# Patient Record
Sex: FEMALE | Race: WHITE | NOT HISPANIC OR LATINO | Employment: FULL TIME | URBAN - METROPOLITAN AREA
[De-identification: names, ages, dates, MRNs, and addresses within clinical notes are randomized per-mention and may not be internally consistent; named-entity substitution may affect disease eponyms.]

---

## 2024-10-28 ENCOUNTER — CLINICAL SUPPORT (OUTPATIENT)
Dept: AUDIOLOGY | Facility: CLINIC | Age: 29
End: 2024-10-28
Payer: COMMERCIAL

## 2024-10-28 ENCOUNTER — APPOINTMENT (OUTPATIENT)
Dept: OTOLARYNGOLOGY | Facility: CLINIC | Age: 29
End: 2024-10-28
Payer: COMMERCIAL

## 2024-10-28 VITALS — WEIGHT: 198 LBS | BODY MASS INDEX: 31.82 KG/M2 | HEIGHT: 66 IN

## 2024-10-28 DIAGNOSIS — H61.303 ACQUIRED STENOSIS OF BOTH EXTERNAL EAR CANALS: Primary | ICD-10-CM

## 2024-10-28 DIAGNOSIS — H90.11 CONDUCTIVE HEARING LOSS OF RIGHT EAR WITH UNRESTRICTED HEARING OF LEFT EAR: ICD-10-CM

## 2024-10-28 DIAGNOSIS — H61.22 IMPACTED CERUMEN OF LEFT EAR: ICD-10-CM

## 2024-10-28 DIAGNOSIS — H90.0 CONDUCTIVE HEARING LOSS, BILATERAL: Primary | ICD-10-CM

## 2024-10-28 PROCEDURE — 92557 COMPREHENSIVE HEARING TEST: CPT

## 2024-10-28 PROCEDURE — 3008F BODY MASS INDEX DOCD: CPT | Performed by: OTOLARYNGOLOGY

## 2024-10-28 PROCEDURE — 69210 REMOVE IMPACTED EAR WAX UNI: CPT | Performed by: OTOLARYNGOLOGY

## 2024-10-28 PROCEDURE — 99213 OFFICE O/P EST LOW 20 MIN: CPT | Performed by: OTOLARYNGOLOGY

## 2024-10-28 PROCEDURE — 92567 TYMPANOMETRY: CPT

## 2024-10-28 RX ORDER — PANTOPRAZOLE SODIUM 40 MG/1
40 TABLET, DELAYED RELEASE ORAL
COMMUNITY
Start: 2021-03-09

## 2024-10-28 RX ORDER — MULTIVIT-MIN/FOLIC/VIT K/LYCOP 400-300MCG
TABLET ORAL
COMMUNITY

## 2024-10-28 RX ORDER — METOPROLOL TARTRATE 50 MG/1
1 TABLET ORAL
COMMUNITY
Start: 2024-09-22

## 2024-10-28 RX ORDER — IBUPROFEN 100 MG/5ML
1000 SUSPENSION, ORAL (FINAL DOSE FORM) ORAL
COMMUNITY

## 2024-10-28 RX ORDER — CALCIUM CARBONATE/VITAMIN D3 600 MG-10
TABLET ORAL
COMMUNITY

## 2024-10-28 ASSESSMENT — PATIENT HEALTH QUESTIONNAIRE - PHQ9
2. FEELING DOWN, DEPRESSED OR HOPELESS: NOT AT ALL
1. LITTLE INTEREST OR PLEASURE IN DOING THINGS: NOT AT ALL
SUM OF ALL RESPONSES TO PHQ9 QUESTIONS 1 AND 2: 0

## 2024-12-16 ENCOUNTER — TELEPHONE (OUTPATIENT)
Dept: OTOLARYNGOLOGY | Facility: HOSPITAL | Age: 29
End: 2024-12-16
Payer: COMMERCIAL

## 2024-12-16 NOTE — TELEPHONE ENCOUNTER
Patient called complaining of L ear pain and popping with muffled hearing. States was on steroids for this a few months ago but did not have complete resolution of issue. Wondering if she should go to the emergency room. RN offered an appointment with Dr. Cano at [facility] but patient declined. Will follow up after s/w MD.

## 2024-12-17 ENCOUNTER — OFFICE VISIT (OUTPATIENT)
Dept: OTOLARYNGOLOGY | Facility: CLINIC | Age: 29
End: 2024-12-17
Payer: COMMERCIAL

## 2024-12-17 VITALS — HEIGHT: 67 IN | BODY MASS INDEX: 32.18 KG/M2 | TEMPERATURE: 97.1 F | WEIGHT: 205 LBS

## 2024-12-17 DIAGNOSIS — H61.303 ACQUIRED STENOSIS OF BOTH EXTERNAL EAR CANALS: Primary | ICD-10-CM

## 2024-12-17 DIAGNOSIS — H66.012 ACUTE SUPPURATIVE OTITIS MEDIA OF LEFT EAR WITH SPONTANEOUS RUPTURE OF TYMPANIC MEMBRANE, RECURRENCE NOT SPECIFIED: ICD-10-CM

## 2024-12-17 DIAGNOSIS — H90.11 CONDUCTIVE HEARING LOSS OF RIGHT EAR WITH UNRESTRICTED HEARING OF LEFT EAR: ICD-10-CM

## 2024-12-17 PROBLEM — H65.111 ACUTE ALLERGIC MUCOID OTITIS MEDIA OF RIGHT EAR: Status: ACTIVE | Noted: 2024-12-17

## 2024-12-17 PROCEDURE — 3008F BODY MASS INDEX DOCD: CPT | Performed by: OTOLARYNGOLOGY

## 2024-12-17 PROCEDURE — 1036F TOBACCO NON-USER: CPT | Performed by: OTOLARYNGOLOGY

## 2024-12-17 PROCEDURE — 99214 OFFICE O/P EST MOD 30 MIN: CPT | Performed by: OTOLARYNGOLOGY

## 2024-12-17 RX ORDER — PREDNISONE 10 MG/1
TABLET ORAL
Qty: 42 TABLET | Refills: 0 | Status: SHIPPED | OUTPATIENT
Start: 2024-12-17 | End: 2024-12-29

## 2024-12-17 RX ORDER — AMOXICILLIN AND CLAVULANATE POTASSIUM 875; 125 MG/1; MG/1
875 TABLET, FILM COATED ORAL 2 TIMES DAILY
Qty: 14 TABLET | Refills: 0 | Status: SHIPPED | OUTPATIENT
Start: 2024-12-17 | End: 2024-12-27

## 2024-12-17 NOTE — PROGRESS NOTES
Reason for Consult:  left ear pain (Popping sound and sound sensitivity )     Subjective   History Of Present Illness:  Amanda Banks is a 29 y.o. 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 The skin of the ear canal has healed well, at her last visit there was slight mucosalized drum anteriorly but overall dry.      On the right side, she continued to have conductive hearing loss. At her last visit, we discussed options of repair of the acquired stenosis vs. bone conduction implantation on the right vs. continuing observation. For now we are doing observation.    In the last week she has been having left drainage, muffled hearing and popping.     Past Medical History:  She has no past medical history on file.    Surgical History:  She has no past surgical history on file.     Social History:  She reports that she has never smoked. She has never used smokeless tobacco. No history on file for alcohol use and drug use.    Family History:  family history is not on file.     Medications:  Current Outpatient Medications   Medication Instructions    amoxicillin-pot clavulanate (Augmentin) 875-125 mg tablet 875 mg, oral, 2 times daily    ascorbic acid (VITAMIN C) 1,000 mg, Daily RT    DOCOSAHEXAENOIC ACID ORAL 1 capsule, Daily with breakfast    metoprolol tartrate (Lopressor) 50 mg tablet 2 tablets, Every 12 hours scheduled (0630,1830)    multivitamin with iron (Child Multivitamin Plus Iron) chewable tablet Chew.    pantoprazole (PROTONIX) 40 mg    pediatric multivit 22-D3-vit K 5,000 unit- 1,000 mcg tablet,chewable Chew.    predniSONE (Deltasone) 10 mg tablet Take 6 tablets (60 mg) by mouth once daily for 2 days, THEN 5 tablets  "(50 mg) once daily for 2 days, THEN 4 tablets (40 mg) once daily for 2 days, THEN 3 tablets (30 mg) once daily for 2 days, THEN 2 tablets (20 mg) once daily for 2 days, THEN 1 tablet (10 mg) once daily for 2 days.    tobramycin-dexamethasone (Tobradex ST) drops,suspension OPHTHalmic SUSPension 4 drops, Left Ear, 2 times daily, Apply in affected ear    zinc gluconate 30 mg tablet Take by mouth.      Allergies:  Latex, natural rubber; Sulfa (sulfonamide antibiotics); and Oxycodone-acetaminophen    Review of Systems:   A comprehensive 10-point review of systems was obtained including constitutional, neurological, HEENT, pulmonary, cardiovascular, genito-urinary, and other pertinent systems and was negative except as noted in the HPI.     Objective   Physical Exam:  Last Recorded Vitals: Temperature 36.2 °C (97.1 °F), height 1.689 m (5' 6.5\"), weight 93 kg (205 lb).    On physical exam, the patient is a well-nourished, well-developed patient, in no acute distress, able to communicate without assistance in English language. Head and face is atraumatic and normocephalic. Salivary glands are intact. Facial strength is symmetrical bilaterally.       On ear examination:  Right ear: The patient has an open and patent ear canal,  which ends in a blind pouch.   BC>AC  Left ear: The patient has an open and patent ear canal. The neotympanum looks in good condition, there is some scarring. She has a small whole and has drainage coming from it. This was suctioned.   AC>BC  The Gastelum is right    On vestibular exam, the patient has no spontaneous nystagmus, no headshake nystagmus, no head-thrust nystagmus, and no nystagmus on hyperventilation or Valsalva maneuvers. Cardington-Hallpike maneuver is negative bilaterally.       On neuro exam, the patient is alert and oriented x3, cranial nerves are grossly intact, cerebellar exam is normal.      The rest of the exam, including anterior rhinoscopy, oropharyngeal exam, neck exam, and " cardiovascular exam, were normal including no palpable lymphadenopathies, thyroid in the midline position, normal pulses, and normal chest excursion.       Reviewed Results:  Audiology Testing:   I personally reviewed the audiogram from 10/2024 that showed:   Right ear: mild conductive hearing loss with a 20db of air bone gap. Discrimination: 100%   Left ear: normal hearing . Discrimination: 100%      I reviewed her audiogram from 08/2023 that showed a mild conductive hearing loss on the left side with closure of the air-borne gap within 10dB. On the right side, she has a moderate conductive hearing loss in the low frequencies upsloping to mild levels in the mid and high frequencies. She has a 25dB of air-borne gap in the low and mid frequencies on the right side. She has 100% discrimination bilaterally.       I reviewed her audiogram from 06/2022 that shows mild conductive hearing loss on the right side and a normal hearing on the left side with a 10 decibel air-bone gap. She has 100% discrimination on the right and 96% on the left. This is significant improvement.         I reviewed and interpreted the patient's audiogram 11/2021, which shows mild conductive hearing loss on the right side and moderate conductive hearing loss on the left side. The discrimination is 100% on the right and 96% on the left.         Procedure:  None    Assessment/Plan     1. 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 Acute suppurative otitis media of left ear with spontaneous rupture of tympanic membrane, recurrence not specified    3. Conductive hearing loss of right ear with unrestricted hearing of left ear        In summary, Amanda Banks is a 29 y.o. 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 The skin of the ear canal has healed well, there is a little bit of mucosalized drum anteriorly but it is dry.     On the right side, she continued to have conductive hearing loss. We continue to discussed options of repair of the acquired stenosis vs. bone conduction implantation on the right vs. continuing observation. She elected observation.     The left ear has now an infection with mucoid drainage coming from a small perforation. I put her back on augmentin, tobradex and oral steroids. I will see her back in 4 weeks.    Scribe Attestation  By signing my name below, IJordyn Scribe   attest that this documentation has been prepared under the direction and in the presence of Thierno Marrero MD.       Scribe Attestation  By signing my name below, Armida FRENCH Scribe   attest that this documentation has been prepared under the direction and in the presence of Thierno Marrero MD.     ____________________________________________________  Thierno Cano MD  Professor and Chief   Otology/Neurotology/Lateral Skull-Base Surgery   [facility]

## 2025-01-09 ENCOUNTER — APPOINTMENT (OUTPATIENT)
Dept: OTOLARYNGOLOGY | Facility: CLINIC | Age: 30
End: 2025-01-09
Payer: COMMERCIAL

## 2025-01-09 ENCOUNTER — CLINICAL SUPPORT (OUTPATIENT)
Dept: AUDIOLOGY | Facility: CLINIC | Age: 30
End: 2025-01-09
Payer: COMMERCIAL

## 2025-01-09 VITALS — HEIGHT: 66 IN | BODY MASS INDEX: 33.75 KG/M2 | WEIGHT: 210 LBS | TEMPERATURE: 97.3 F

## 2025-01-09 DIAGNOSIS — Q37.9: ICD-10-CM

## 2025-01-09 DIAGNOSIS — H72.92 TYMPANIC MEMBRANE PERFORATION, LEFT: ICD-10-CM

## 2025-01-09 DIAGNOSIS — H61.303 ACQUIRED STENOSIS OF BOTH EXTERNAL EAR CANALS: Primary | ICD-10-CM

## 2025-01-09 DIAGNOSIS — H69.93 DYSFUNCTION OF BOTH EUSTACHIAN TUBES: ICD-10-CM

## 2025-01-09 DIAGNOSIS — H61.303 ACQUIRED STENOSIS OF BOTH EXTERNAL EAR CANALS: ICD-10-CM

## 2025-01-09 DIAGNOSIS — H90.0 CONDUCTIVE HEARING LOSS, BILATERAL: ICD-10-CM

## 2025-01-09 DIAGNOSIS — H90.0 CONDUCTIVE HEARING LOSS, BILATERAL: Primary | ICD-10-CM

## 2025-01-09 PROBLEM — H90.11 CONDUCTIVE HEARING LOSS OF RIGHT EAR WITH UNRESTRICTED HEARING OF LEFT EAR: Status: RESOLVED | Noted: 2024-10-28 | Resolved: 2025-01-09

## 2025-01-09 PROCEDURE — 3008F BODY MASS INDEX DOCD: CPT | Performed by: OTOLARYNGOLOGY

## 2025-01-09 PROCEDURE — 92567 TYMPANOMETRY: CPT

## 2025-01-09 PROCEDURE — 92557 COMPREHENSIVE HEARING TEST: CPT

## 2025-01-09 PROCEDURE — 99214 OFFICE O/P EST MOD 30 MIN: CPT | Performed by: OTOLARYNGOLOGY

## 2025-01-09 ASSESSMENT — PATIENT HEALTH QUESTIONNAIRE - PHQ9
1. LITTLE INTEREST OR PLEASURE IN DOING THINGS: NOT AT ALL
SUM OF ALL RESPONSES TO PHQ9 QUESTIONS 1 AND 2: 0
2. FEELING DOWN, DEPRESSED OR HOPELESS: NOT AT ALL

## 2025-01-09 NOTE — Clinical Note
I put orders. This  CAN BE DONE anywhere: [facility], [facility], or [place].

## 2025-01-09 NOTE — Clinical Note
January 13, 2025     No Recipients    Patient: Amanda Banks   YOB: 1995   Date of Visit: 1/9/2025       Dear Dr. Siegel Recipients:    Thank you for referring Amanda Banks to me for evaluation. Below are my notes for this consultation.  If you have questions, please do not hesitate to call me. I look forward to following your patient along with you.       Sincerely,     Thierno Marrero MD      CC: No Recipients  ______________________________________________________________________________________            Reason for Consult:  Follow-up (Hearing test )     Subjective  History Of Present Illness:  Amanda Banks is a 29 y.o. 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 The skin of the ear canal has healed well, at her last visit there was slight mucosalized drum anteriorly but overall dry.      On the right side, she continued to have conductive hearing loss. At her last visit, we discussed options of repair of the acquired stenosis vs. bone conduction implantation on the right vs. continuing observation. For now we are doing observation.    At her last visit on 12/17/2024, she had been having left ear drainage, muffled hearing and popping. She had evidence of AOM with mucoid drainage and a new small perforation. She was placed on augmentin, tobradex, and oral steroids. The left ear drainage resolved two weeks ago, but she is still struggling with her hearing on the left side. She is very bothered by her hearing being out of balance, and she is interested in options to help her hearing.     Past Medical History:  She has no past medical history on file.    Surgical History:  She has no past surgical history on  "file.     Social History:  She reports that she has never smoked. She has never used smokeless tobacco. No history on file for alcohol use and drug use.    Family History:  family history is not on file.     Medications:  Current Outpatient Medications   Medication Instructions    ascorbic acid (VITAMIN C) 1,000 mg, Daily RT    DOCOSAHEXAENOIC ACID ORAL 1 capsule, Daily with breakfast    metoprolol tartrate (Lopressor) 50 mg tablet 2 tablets, Every 12 hours scheduled (0630,1830)    multivitamin with iron (Child Multivitamin Plus Iron) chewable tablet Chew.    pantoprazole (PROTONIX) 40 mg    pediatric multivit 22-D3-vit K 5,000 unit- 1,000 mcg tablet,chewable Chew.    zinc gluconate 30 mg tablet Take by mouth.      Allergies:  Latex, natural rubber; Sulfa (sulfonamide antibiotics); and Oxycodone-acetaminophen    Review of Systems:   A comprehensive 10-point review of systems was obtained including constitutional, neurological, HEENT, pulmonary, cardiovascular, genito-urinary, and other pertinent systems and was negative except as noted in the HPI.     Objective  Physical Exam:  Last Recorded Vitals: Temperature 36.3 °C (97.3 °F), height 1.676 m (5' 6\"), weight 95.3 kg (210 lb).    On physical exam, the patient is a well-nourished, well-developed patient, in no acute distress, able to communicate without assistance in English language. Head and face is atraumatic and normocephalic. Salivary glands are intact. Facial strength is symmetrical bilaterally.       On ear examination:  Right ear: The patient has an open and patent ear canal,  which ends in a blind pouch.   BC>AC  Left ear: The patient has an open and patent ear canal. The neotympanum looks in good condition, there is some scarring. She has a small dry hole (< 5%) without drainage.   AC>BC  The Gastelum is right    On neuro exam, the patient is alert and oriented x3, cranial nerves are grossly intact, cerebellar exam is normal.      The rest of the exam, " including anterior rhinoscopy, oropharyngeal exam, neck exam, and cardiovascular exam, were normal including no palpable lymphadenopathies, thyroid in the midline position, normal pulses, and normal chest excursion.       Reviewed Results:  Audiology Testing:  I personally reviewed the audiogram from 1/9/2025 that showed:  Right ear: moderate conductive hearing loss with  25-35dB ABG. Discrimination 92%.   Left ear: mild conductive hearing loss in the low and high frequencies with normal hearing in the mid frequencies, max ABG 15-20dB ABG at 500 and 4000 Hz. Discrimination 96%.      I personally reviewed the audiogram from 10/2024 that showed:   Right ear: mild conductive hearing loss with a 20db of air bone gap. Discrimination: 100%   Left ear: normal hearing . Discrimination: 100%      I reviewed her audiogram from 08/2023 that showed a mild conductive hearing loss on the left side with closure of the air-borne gap within 10dB. On the right side, she has a moderate conductive hearing loss in the low frequencies upsloping to mild levels in the mid and high frequencies. She has a 25dB of air-borne gap in the low and mid frequencies on the right side. She has 100% discrimination bilaterally.       I reviewed her audiogram from 06/2022 that shows mild conductive hearing loss on the right side and a normal hearing on the left side with a 10 decibel air-bone gap. She has 100% discrimination on the right and 96% on the left. This is significant improvement.         I reviewed and interpreted the patient's audiogram 11/2021, which shows mild conductive hearing loss on the right side and moderate conductive hearing loss on the left side. The discrimination is 100% on the right and 96% on the left.         Procedure:  None    Assessment/Plan    1. 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 Conductive hearing loss, bilateral    3. Tympanic membrane perforation, left    4. Dysfunction of both eustachian tubes    5.  Cleft palate and lip ([facility])          In summary, Amanda Banks is a 29 y.o. 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 The skin of the ear canal has healed well, there is a little bit of mucosalized drum anteriorly but it is dry.     On the right side, she continued to have conductive hearing loss. We continued to discuss options of repair of the acquired stenosis vs. bone conduction implantation on the right vs. continuing observation. She elected observation.     The left ear developed an acute infection with spontaneous rupture of the tympanic membrane in mid-December 2024. This has resolved, with a persistent small perforation and associated mild conductive hearing loss. We discussed that given her history of cleft palate, she will have chronic Eustachian tube dysfunction, and this perforation is acting as a physiologic ventilation pathway for her. Given its small size and better hearing status on the left, we do not recommend any intervention on the left.     She is bothered by the hearing loss she has. We discussed options for the right ear including surgical repair of the acquired stenosis vs JESSI. She would like to wait on JESSI as a last resort for either ear. She would like to proceed with correction of the canal stenosis on the right. We will plan for right tympanoplasty with canalplasty and skin graft from the right arm.     The risks, indications, alternatives and complications of doing this procedure were discussed with the patient.  These included, but are not limited to facial nerve injury, deafness in the operated ear, vertigo, dizziness, imbalance, facial weakness or paralysis, change in sense of taste,  perforation of eardrum, pain, bleeding, infection, scarring, need for further surgery, recurrence. She elected to proceed. We will schedule this in the near future.    Jaky Frye MD  Neurotology Fellow    ____________________________________________________  Thierno Cano MD  Professor and Chief   Otology/Neurotology/Lateral Skull-Base Surgery   [facility]

## 2025-01-09 NOTE — PROGRESS NOTES
AUDIOLOGY ADULT EVALUATION      Name:  Amanda Banks   :  1995  Age:  29 y.o.  Date of Evaluation:  2025    Time: 14:55-15:15    IMPRESSIONS     Right ear: Abnormal middle ear functioning with moderate rising to mild conductive hearing loss and excellent (92%) word understanding at 75 dB HL.   Left ear: Abnormal middle ear functioning with mild conductive hearing loss and excellent (96%) word understanding at 75 dB HL.     Amplification needs: Amplification is warranted if conductive hearing loss persists even with medical intervention. She will qualify for bone conduction implantation if she wishes to proceed with amplification options.     RECOMMENDATIONS     Continue medical follow up with primary care provider and/or Ears Nose and Throat (ENT) provider as recommended.  Return for audiologic evaluation in conjunction with medical management or annually (whichever is sooner) to monitor hearing sensitivity and assess middle ear status or sooner should concerns arise. The audiology department can be reached at [phone] to schedule an appointment.   Consider BAHA evaluation.     HISTORY     Ms. Banks, 29 y.o., was seen today for a follow-up audiologic evaluation in conjunction with an evaluation with Thierno Marrero MD, due to known bilateral conductive hearing loss as a result of stenosis and lateralization of both ear drums.     She is s/p left-sided tympanoplasty and canaloplasty with reconstruction of acquired stenosis of the ear canal with skin graft from the left arm.     Today she reported that since her last visit she has had fluid (noted by her PCP) and at least one ear infection. She continues to endorse decreased hearing sensitivity and aural fullness and popping in both ears. She denied tinnitus, dizziness, ear pain or ear drainage.     AUDIOGRAM         TEST RESULTS     Otoscopic Evaluation:  Right Ear: Ear canal clear, tympanic membrane visualized.  Left Ear: Ear canal clear,  tympanic membrane visualized.    Tympanometry (226 Hz): This test is an objective evaluation of middle ear function. CPT code: 47397   Right Ear: Type B, restricted eardrum mobility consistent with outer/middle ear involvement.   Left Ear: Type B, large ear canal volume consistent with possible eardrum perforation.     Acoustic Reflexes: This test is an objective measure of auditory and facial nerve pathways.   (Probe) Right Ear (ipsi right stimulus ear; contralateral left stimulus ear): Could not test due to type B immittance result.  (Probe) Left Ear (ipsi left stimulus ear; contralateral right stimulus ear): Could not test due to type B immittance result.    Distortion Product Otoacoustic Emissions (DPOAE): This test is a measurement of responses which are generated by the cochlea when it is simultaneously stimulated by two pure tone frequencies. CPT code: 42672   Right Ear: Did not test.  Left Ear: Did not test.  Present OAEs suggest normal or near cochlear outer hair cell function for corresponding frequency region(s). Absent OAEs with normal middle ear function can be consistent with some degree of hearing loss. Assessment of cochlear outer hair cell function may be impacted by outer or middle ear function.    Test technique: Conventional Audiometry via headphones. This test is an evaluation hearing sensitivity via air and bone conduction and speech recognition testing. CPT code: 47384  Reliability: good    Pure Tone Audiometry (125-8000 Hz):     Right Ear: Moderate rising to mild conductive hearing loss.   Left Ear: Mild conductive hearing loss rising to normal hearing sensitivity sloping to mild conductive hearing loss.     Speech Audiometry:   Right Ear: Speech Reception Threshold (SRT) was obtained at 35 dB HL. This is in good agreement with three frequency Pure Tone Average.   Word Recognition scores were excellent (92%) in quiet when words were presented at 75 dB HL. These results are based on  [facility] Auditory Test No.6 (NU-6) Ordered by difficulty (N=10).  Left Ear: Speech Reception Threshold (SRT) was obtained at 25 dB HL. This is in good agreement with three frequency Pure Tone Average.   Word Recognition scores were excellent (96%) in quiet when words were presented at 75 dB HL. These results are based on [facility] Auditory Test No.6 (NU-6) Ordered by difficulty (N=10).     Comparison of today's results with previous test results:  Essentially stable in the right ear and slightly progressive in the left ear since last evaluation on 10/28/2024          DEMARCO Stanley, CCC-A  Licensed Clinical Audiologist  Subdivision Lead Audiologist - [facility]     Degree of   Hearing Sensitivity dB Range   Within Normal Limits (WNL) 0 - 20   Slight 25   Mild 26 - 40   Moderate 41 - 55   Moderately-Severe 56 - 70   Severe 71 - 90   Profound 91 +     Key   CHL Conductive Hearing Loss   ECV Ear Canal Volume   HA Hearing Aid   NIHL Noise-Induced Hearing Loss   PTA Pure Tone Average   SNHL Sensorineural Hearing Loss   TM Tympanic Membrane   WNL Within Normal Limits

## 2025-01-09 NOTE — PROGRESS NOTES
Reason for Consult:  Follow-up (Hearing test )     Subjective   History Of Present Illness:  Amanda Banks is a 29 y.o. 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 The skin of the ear canal has healed well, at her last visit there was slight mucosalized drum anteriorly but overall dry.      On the right side, she continued to have conductive hearing loss. At her last visit, we discussed options of repair of the acquired stenosis vs. bone conduction implantation on the right vs. continuing observation. For now we are doing observation.    At her last visit on 12/17/2024, she had been having left ear drainage, muffled hearing and popping. She had evidence of AOM with mucoid drainage and a new small perforation. She was placed on augmentin, tobradex, and oral steroids. The left ear drainage resolved two weeks ago, but she is still struggling with her hearing on the left side. She is very bothered by her hearing being out of balance, and she is interested in options to help her hearing.     Past Medical History:  She has no past medical history on file.    Surgical History:  She has no past surgical history on file.     Social History:  She reports that she has never smoked. She has never used smokeless tobacco. No history on file for alcohol use and drug use.    Family History:  family history is not on file.     Medications:  Current Outpatient Medications   Medication Instructions    ascorbic acid (VITAMIN C) 1,000 mg, Daily RT    DOCOSAHEXAENOIC ACID ORAL 1 capsule, Daily with breakfast    metoprolol tartrate (Lopressor) 50 mg tablet 2 tablets, Every 12 hours scheduled (0630,1830)    multivitamin with iron (Child  "Multivitamin Plus Iron) chewable tablet Chew.    pantoprazole (PROTONIX) 40 mg    pediatric multivit 22-D3-vit K 5,000 unit- 1,000 mcg tablet,chewable Chew.    zinc gluconate 30 mg tablet Take by mouth.      Allergies:  Latex, natural rubber; Sulfa (sulfonamide antibiotics); and Oxycodone-acetaminophen    Review of Systems:   A comprehensive 10-point review of systems was obtained including constitutional, neurological, HEENT, pulmonary, cardiovascular, genito-urinary, and other pertinent systems and was negative except as noted in the HPI.     Objective   Physical Exam:  Last Recorded Vitals: Temperature 36.3 °C (97.3 °F), height 1.676 m (5' 6\"), weight 95.3 kg (210 lb).    On physical exam, the patient is a well-nourished, well-developed patient, in no acute distress, able to communicate without assistance in English language. Head and face is atraumatic and normocephalic. Salivary glands are intact. Facial strength is symmetrical bilaterally.       On ear examination:  Right ear: The patient has an open and patent ear canal,  which ends in a blind pouch.   BC>AC  Left ear: The patient has an open and patent ear canal. The neotympanum looks in good condition, there is some scarring. She has a small dry hole (< 5%) without drainage.   AC>BC  The Gastelum is right    On neuro exam, the patient is alert and oriented x3, cranial nerves are grossly intact, cerebellar exam is normal.      The rest of the exam, including anterior rhinoscopy, oropharyngeal exam, neck exam, and cardiovascular exam, were normal including no palpable lymphadenopathies, thyroid in the midline position, normal pulses, and normal chest excursion.       Reviewed Results:  Audiology Testing:  I personally reviewed the audiogram from 1/9/2025 that showed:  Right ear: moderate conductive hearing loss with  25-35dB ABG. Discrimination 92%.   Left ear: mild conductive hearing loss in the low and high frequencies with normal hearing in the mid " frequencies, max ABG 15-20dB ABG at 500 and 4000 Hz. Discrimination 96%.      I personally reviewed the audiogram from 10/2024 that showed:   Right ear: mild conductive hearing loss with a 20db of air bone gap. Discrimination: 100%   Left ear: normal hearing . Discrimination: 100%      I reviewed her audiogram from 08/2023 that showed a mild conductive hearing loss on the left side with closure of the air-borne gap within 10dB. On the right side, she has a moderate conductive hearing loss in the low frequencies upsloping to mild levels in the mid and high frequencies. She has a 25dB of air-borne gap in the low and mid frequencies on the right side. She has 100% discrimination bilaterally.       I reviewed her audiogram from 06/2022 that shows mild conductive hearing loss on the right side and a normal hearing on the left side with a 10 decibel air-bone gap. She has 100% discrimination on the right and 96% on the left. This is significant improvement.         I reviewed and interpreted the patient's audiogram 11/2021, which shows mild conductive hearing loss on the right side and moderate conductive hearing loss on the left side. The discrimination is 100% on the right and 96% on the left.         Procedure:  None    Assessment/Plan     1. 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 Conductive hearing loss, bilateral    3. Tympanic membrane perforation, left    4. Dysfunction of both eustachian tubes    5. Cleft palate and lip ([facility])          In summary, Amanda Banks is a 29 y.o. 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 The skin of the ear canal has healed well, there is a little bit of mucosalized drum anteriorly but  it is dry.     On the right side, she continued to have conductive hearing loss. We continued to discuss options of repair of the acquired stenosis vs. bone conduction implantation on the right vs. continuing observation. She elected observation.     The left ear developed an acute infection with spontaneous rupture of the tympanic membrane in mid-December 2024. This has resolved, with a persistent small perforation and associated mild conductive hearing loss. We discussed that given her history of cleft palate, she will have chronic Eustachian tube dysfunction, and this perforation is acting as a physiologic ventilation pathway for her. Given its small size and better hearing status on the left, we do not recommend any intervention on the left.     She is bothered by the hearing loss she has. We discussed options for the right ear including surgical repair of the acquired stenosis vs JESSI. She would like to wait on JESSI as a last resort for either ear. She would like to proceed with correction of the canal stenosis on the right. We will plan for right tympanoplasty with canalplasty and skin graft from the right arm.     The risks, indications, alternatives and complications of doing this procedure were discussed with the patient.  These included, but are not limited to facial nerve injury, deafness in the operated ear, vertigo, dizziness, imbalance, facial weakness or paralysis, change in sense of taste, perforation of eardrum, pain, bleeding, infection, scarring, need for further surgery, recurrence. She elected to proceed. We will schedule this in the near future.    Jaky Frye MD  Neurotology Fellow    ____________________________________________________  Thierno Cano MD  Professor and Chief   Otology/Neurotology/Lateral Skull-Base Surgery   [facility]

## 2025-01-14 DIAGNOSIS — H65.111 ACUTE ALLERGIC MUCOID OTITIS MEDIA OF RIGHT EAR: ICD-10-CM

## 2025-01-14 DIAGNOSIS — H90.0 CONDUCTIVE HEARING LOSS, BILATERAL: ICD-10-CM

## 2025-01-14 DIAGNOSIS — Z01.818 PREOPERATIVE CLEARANCE: ICD-10-CM

## 2025-01-30 ENCOUNTER — TELEPHONE (OUTPATIENT)
Dept: OTOLARYNGOLOGY | Facility: HOSPITAL | Age: 30
End: 2025-01-30
Payer: COMMERCIAL

## 2025-01-30 DIAGNOSIS — H72.92 TYMPANIC MEMBRANE PERFORATION, LEFT: ICD-10-CM

## 2025-01-30 RX ORDER — AMOXICILLIN AND CLAVULANATE POTASSIUM 875; 125 MG/1; MG/1
1 TABLET, FILM COATED ORAL 2 TIMES DAILY
Qty: 20 TABLET | Refills: 0 | Status: SHIPPED | OUTPATIENT
Start: 2025-01-30 | End: 2025-02-09

## 2025-01-30 NOTE — TELEPHONE ENCOUNTER
Patient reports continued L ear pain despite finishing abx and steroid taper. States drainage and popping of L ear have resolved. Is using tobradex drops without any relief of pain. MD to advise.

## 2025-02-10 ENCOUNTER — APPOINTMENT (OUTPATIENT)
Dept: OTOLARYNGOLOGY | Facility: CLINIC | Age: 30
End: 2025-02-10
Payer: COMMERCIAL

## 2025-02-10 VITALS — HEIGHT: 66 IN | WEIGHT: 210 LBS | BODY MASS INDEX: 33.75 KG/M2

## 2025-02-10 DIAGNOSIS — H72.92 TYMPANIC MEMBRANE PERFORATION, LEFT: ICD-10-CM

## 2025-02-10 DIAGNOSIS — Q37.9: ICD-10-CM

## 2025-02-10 DIAGNOSIS — H69.93 DYSFUNCTION OF BOTH EUSTACHIAN TUBES: ICD-10-CM

## 2025-02-10 DIAGNOSIS — H61.303 ACQUIRED STENOSIS OF BOTH EXTERNAL EAR CANALS: Primary | ICD-10-CM

## 2025-02-10 DIAGNOSIS — H90.0 CONDUCTIVE HEARING LOSS, BILATERAL: ICD-10-CM

## 2025-02-10 PROCEDURE — 3008F BODY MASS INDEX DOCD: CPT | Performed by: OTOLARYNGOLOGY

## 2025-02-10 PROCEDURE — 99214 OFFICE O/P EST MOD 30 MIN: CPT | Performed by: OTOLARYNGOLOGY

## 2025-02-10 RX ORDER — FLUTICASONE PROPIONATE 50 MCG
2 SPRAY, SUSPENSION (ML) NASAL DAILY
Qty: 16 G | Refills: 11 | Status: SHIPPED | OUTPATIENT
Start: 2025-02-10 | End: 2026-02-10

## 2025-02-10 RX ORDER — NARATRIPTAN 2.5 MG/1
2.5 TABLET ORAL ONCE AS NEEDED
COMMUNITY

## 2025-02-10 RX ORDER — AMITRIPTYLINE HYDROCHLORIDE 10 MG/1
10 TABLET, FILM COATED ORAL
COMMUNITY

## 2025-02-10 NOTE — LETTER
February 14, 2025     Miguel Angel Mason, CARRIE-CNP  [address]  [facility] [address]    Patient: Amanda Banks   YOB: 1995   Date of Visit: 2/10/2025       Dear CARRIE Olivo-CNP:    Thank you for referring Amanda Banks to me for evaluation. Below are my notes for this consultation.  If you have questions, please do not hesitate to call me. I look forward to following your patient along with you.       Sincerely,     Thierno Marrero MD      CC: No Recipients  ______________________________________________________________________________________            Reason for Consult:  Follow-up (Left ear pain)     Subjective  History Of Present Illness:  Amanda Banks is a 29 y.o. 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 The skin of the ear canal has healed well, at her last visit there was slight mucosalized drum anteriorly but overall dry.      On the right side, she continued to have conductive hearing loss. At her last visit, we discussed options of repair of the acquired stenosis vs. bone conduction implantation on the right vs. continuing observation. For now we are doing observation.     At her last visit on 12/17/2024, she had been having left ear drainage, muffled hearing and popping. She had evidence of AOM with mucoid drainage and a new small perforation. She was placed on augmentin, tobradex, and oral steroids. The left ear drainage resolved two weeks ago, but she is still struggling with her hearing on the left side. She is very bothered by her hearing being out of balance, and she is interested in options to help her hearing.     She now has a small  pinpoint perforation and right stenosis of the EAC. She reports a popping sensation when not chewing, especially in the mornings. This is consistent with eustachian tube dysfunction.     Past Medical History:  She has a past medical history of Adverse effect of anesthesia, Arrhythmia, blood clots, and Migraines.    Surgical History:  She has a past surgical history that includes Other surgical history; Tympanostomy tube placement; Lumbar fusion; Cosmetic surgery; Appendectomy; and Other surgical history.     Social History:  She reports that she has never smoked. She has never used smokeless tobacco. She reports that she does not currently use alcohol. She reports that she does not use drugs.    Family History:  family history is not on file.     Medications:  Current Outpatient Medications   Medication Instructions   • amitriptyline (ELAVIL) 10 mg, oral   • ascorbic acid (VITAMIN C) 1,000 mg, Daily RT   • DOCOSAHEXAENOIC ACID ORAL 1 capsule, Daily with breakfast   • fluticasone (Flonase) 50 mcg/actuation nasal spray 2 sprays, Each Nostril, Daily, Shake gently. Before first use, prime pump. After use, clean tip and replace cap.   • GARLIC ORAL oral   • metoprolol tartrate (Lopressor) 50 mg tablet 2 tablets, Every 12 hours scheduled (0630,1830)   • multivitamin with iron (Child Multivitamin Plus Iron) chewable tablet Chew.   • naratriptan (AMERGE) 2.5 mg, oral, Once as needed   • pantoprazole (PROTONIX) 40 mg   • pediatric multivit 22-D3-vit K 5,000 unit- 1,000 mcg tablet,chewable Chew.   • zinc gluconate 30 mg tablet Take by mouth.      Allergies:  Latex, natural rubber; Sulfa (sulfonamide antibiotics); and Oxycodone-acetaminophen    Review of Systems:   A comprehensive 10-point review of systems was obtained including constitutional, neurological, HEENT, pulmonary, cardiovascular, genito-urinary, and other pertinent systems and was negative except as noted in the HPI.     Objective  Physical Exam:  Last Recorded  "Vitals: Height 1.676 m (5' 6\"), weight 95.3 kg (210 lb).    On physical exam, the patient is a well-nourished, well-developed patient, in no acute distress, able to communicate without assistance in English language. Head and face is atraumatic and normocephalic. Salivary glands are intact. Facial strength is symmetrical bilaterally.       On ear examination:  Right ear: The patient has an open and patent ear canal. Acquired stenosis of ear canal ending in a blind pouch.    BC>AC  Left ear: The patient has an open and patent ear canal. The neotympanum  is thickened with a pinpoint perforation in the anterior inferior quadrant .  AC>BC  The Gastelum is right    On vestibular exam, the patient has no spontaneous nystagmus, no headshake nystagmus, no head-thrust nystagmus, and no nystagmus on hyperventilation or Valsalva maneuvers. [place]-Hallpike maneuver is negative bilaterally.       On neuro exam, the patient is alert and oriented x3, cranial nerves are grossly intact, cerebellar exam is normal.      The rest of the exam, including anterior rhinoscopy, oropharyngeal exam, neck exam, and cardiovascular exam, were normal including no palpable lymphadenopathies, thyroid in the midline position, normal pulses, and normal chest excursion.       Reviewed Results:  Audiology Testing:  I personally reviewed the audiogram from 1/9/2025 that showed:  Right ear: moderate conductive hearing loss with  25-35dB ABG. Discrimination 92%.   Left ear: mild conductive hearing loss in the low and high frequencies with normal hearing in the mid frequencies, max ABG 15-20dB ABG at 500 and 4000 Hz. Discrimination 96%.       I personally reviewed the audiogram from 10/2024 that showed:            Right ear: mild conductive hearing loss with a 20db of air bone gap. Discrimination: 100%            Left ear: normal hearing . Discrimination: 100%       I reviewed her audiogram from 08/2023 that showed a mild conductive hearing loss on the left side " with closure of the air-borne gap within 10dB. On the right side, she has a moderate conductive hearing loss in the low frequencies upsloping to mild levels in the mid and high frequencies. She has a 25dB of air-borne gap in the low and mid frequencies on the right side. She has 100% discrimination bilaterally.       I reviewed her audiogram from 06/2022 that shows mild conductive hearing loss on the right side and a normal hearing on the left side with a 10 decibel air-bone gap. She has 100% discrimination on the right and 96% on the left. This is significant improvement.          I reviewed and interpreted the patient's audiogram 11/2021, which shows mild conductive hearing loss on the right side and moderate conductive hearing loss on the left side. The discrimination is 100% on the right and 96% on the left.      Imaging:  None     Procedure:  None    Assessment/Plan    1. 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 Conductive hearing loss, bilateral    3. Tympanic membrane perforation, left    4. Dysfunction of both eustachian tubes    5. Cleft palate and lip ([facility])        In summary, Amanda Banks is a 29 y.o. 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 The skin of the ear canal has healed well, there is a little bit of mucosalized drum anteriorly but it is dry.     On the right side, she continued to have conductive hearing loss. We continued to discuss options of repair of the acquired stenosis vs. bone conduction implantation on the right vs. continuing observation. She elected observation.    On the left side she has thickening of neotympanum with a pinpoint perforation. Her left ear was her better hearing ear.  The left ear developed an acute infection with spontaneous rupture of the tympanic membrane in mid-December 2024. This has resolved, with a persistent small perforation and associated mild conductive hearing loss. We discussed that given her history of cleft palate, she will have chronic Eustachian tube dysfunction, and this perforation is acting as a physiologic ventilation pathway for her. Given its small size and better hearing status on the left, we do not recommend any intervention on the left.     She is bothered by the hearing loss she has. We discussed options for the right ear including surgical repair of the acquired stenosis vs JESSI. She would like to wait on JESSI as a last resort for either ear. She would like to proceed with correction of the canal stenosis on the right. We will plan for right tympanoplasty with canalplasty and skin graft from the right arm.     The right ear has gotten worse. She is interested in repairing of her acquired stenosis on the right. She is scheduled for surgery on 02/25. She has a CT scheduled prior to surgery. I will see her back before surgery. She was provided with a Flonase prescription today.     The risks, indications, alternatives and complications of doing this procedure were discussed with the patient.  She elected to proceed.          ____________________________________________________  Thierno Cano MD  Professor and Chief   Otology/Neurotology/Lateral Skull-Base Surgery   [facility]    Scribe Attestation  By signing my name below, I, Tracy Abreu, Scribe   attest that this documentation has been prepared under the direction and in the presence of Thierno Marrero MD.

## 2025-02-10 NOTE — PROGRESS NOTES
Reason for Consult:  Follow-up (Left ear pain)     Subjective   History Of Present Illness:  Amanda Banks is a 29 y.o. 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 The skin of the ear canal has healed well, at her last visit there was slight mucosalized drum anteriorly but overall dry.      On the right side, she continued to have conductive hearing loss. At her last visit, we discussed options of repair of the acquired stenosis vs. bone conduction implantation on the right vs. continuing observation. For now we are doing observation.     At her last visit on 12/17/2024, she had been having left ear drainage, muffled hearing and popping. She had evidence of AOM with mucoid drainage and a new small perforation. She was placed on augmentin, tobradex, and oral steroids. The left ear drainage resolved two weeks ago, but she is still struggling with her hearing on the left side. She is very bothered by her hearing being out of balance, and she is interested in options to help her hearing.     She now has a small pinpoint perforation and right stenosis of the EAC. She reports a popping sensation when not chewing, especially in the mornings. This is consistent with eustachian tube dysfunction.     Past Medical History:  She has a past medical history of Adverse effect of anesthesia, Arrhythmia, blood clots, and Migraines.    Surgical History:  She has a past surgical history that includes Other surgical history; Tympanostomy tube placement; Lumbar fusion; Cosmetic surgery; Appendectomy; and Other surgical history.     Social History:  She reports that she has never smoked. She has never used smokeless tobacco. She reports that she  "does not currently use alcohol. She reports that she does not use drugs.    Family History:  family history is not on file.     Medications:  Current Outpatient Medications   Medication Instructions    amitriptyline (ELAVIL) 10 mg, oral    ascorbic acid (VITAMIN C) 1,000 mg, Daily RT    DOCOSAHEXAENOIC ACID ORAL 1 capsule, Daily with breakfast    fluticasone (Flonase) 50 mcg/actuation nasal spray 2 sprays, Each Nostril, Daily, Shake gently. Before first use, prime pump. After use, clean tip and replace cap.    GARLIC ORAL oral    metoprolol tartrate (Lopressor) 50 mg tablet 2 tablets, Every 12 hours scheduled (0630,1830)    multivitamin with iron (Child Multivitamin Plus Iron) chewable tablet Chew.    naratriptan (AMERGE) 2.5 mg, oral, Once as needed    pantoprazole (PROTONIX) 40 mg    pediatric multivit 22-D3-vit K 5,000 unit- 1,000 mcg tablet,chewable Chew.    zinc gluconate 30 mg tablet Take by mouth.      Allergies:  Latex, natural rubber; Sulfa (sulfonamide antibiotics); and Oxycodone-acetaminophen    Review of Systems:   A comprehensive 10-point review of systems was obtained including constitutional, neurological, HEENT, pulmonary, cardiovascular, genito-urinary, and other pertinent systems and was negative except as noted in the HPI.     Objective   Physical Exam:  Last Recorded Vitals: Height 1.676 m (5' 6\"), weight 95.3 kg (210 lb).    On physical exam, the patient is a well-nourished, well-developed patient, in no acute distress, able to communicate without assistance in English language. Head and face is atraumatic and normocephalic. Salivary glands are intact. Facial strength is symmetrical bilaterally.       On ear examination:  Right ear: The patient has an open and patent ear canal. Acquired stenosis of ear canal ending in a blind pouch.    BC>AC  Left ear: The patient has an open and patent ear canal. The neotympanum  is thickened with a pinpoint perforation in the anterior inferior quadrant " .  AC>BC  The Gastelum is right    On vestibular exam, the patient has no spontaneous nystagmus, no headshake nystagmus, no head-thrust nystagmus, and no nystagmus on hyperventilation or Valsalva maneuvers. [place]-Hallpike maneuver is negative bilaterally.       On neuro exam, the patient is alert and oriented x3, cranial nerves are grossly intact, cerebellar exam is normal.      The rest of the exam, including anterior rhinoscopy, oropharyngeal exam, neck exam, and cardiovascular exam, were normal including no palpable lymphadenopathies, thyroid in the midline position, normal pulses, and normal chest excursion.       Reviewed Results:  Audiology Testing:  I personally reviewed the audiogram from 1/9/2025 that showed:  Right ear: moderate conductive hearing loss with  25-35dB ABG. Discrimination 92%.   Left ear: mild conductive hearing loss in the low and high frequencies with normal hearing in the mid frequencies, max ABG 15-20dB ABG at 500 and 4000 Hz. Discrimination 96%.       I personally reviewed the audiogram from 10/2024 that showed:            Right ear: mild conductive hearing loss with a 20db of air bone gap. Discrimination: 100%            Left ear: normal hearing . Discrimination: 100%       I reviewed her audiogram from 08/2023 that showed a mild conductive hearing loss on the left side with closure of the air-borne gap within 10dB. On the right side, she has a moderate conductive hearing loss in the low frequencies upsloping to mild levels in the mid and high frequencies. She has a 25dB of air-borne gap in the low and mid frequencies on the right side. She has 100% discrimination bilaterally.       I reviewed her audiogram from 06/2022 that shows mild conductive hearing loss on the right side and a normal hearing on the left side with a 10 decibel air-bone gap. She has 100% discrimination on the right and 96% on the left. This is significant improvement.          I reviewed and interpreted the patient's  audiogram 11/2021, which shows mild conductive hearing loss on the right side and moderate conductive hearing loss on the left side. The discrimination is 100% on the right and 96% on the left.      Imaging:  None     Procedure:  None    Assessment/Plan     1. 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 Conductive hearing loss, bilateral    3. Tympanic membrane perforation, left    4. Dysfunction of both eustachian tubes    5. Cleft palate and lip ([facility])        In summary, Amanda Banks is a 29 y.o. 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 The skin of the ear canal has healed well, there is a little bit of mucosalized drum anteriorly but it is dry.     On the right side, she continued to have conductive hearing loss. We continued to discuss options of repair of the acquired stenosis vs. bone conduction implantation on the right vs. continuing observation. She elected observation.    On the left side she has thickening of neotympanum with a pinpoint perforation. Her left ear was her better hearing ear. The left ear developed an acute infection with spontaneous rupture of the tympanic membrane in mid-December 2024. This has resolved, with a persistent small perforation and associated mild conductive hearing loss. We discussed that given her history of cleft palate, she will have chronic Eustachian tube dysfunction, and this perforation is acting as a physiologic ventilation pathway for her. Given its small size and better hearing status on the left, we do not recommend any intervention on the left.     She is bothered by the hearing loss she has. We discussed options for the right ear including surgical repair of the  acquired stenosis vs JESSI. She would like to wait on JESSI as a last resort for either ear. She would like to proceed with correction of the canal stenosis on the right. We will plan for right tympanoplasty with canalplasty and skin graft from the right arm.     The right ear has gotten worse. She is interested in repairing of her acquired stenosis on the right. She is scheduled for surgery on 02/25. She has a CT scheduled prior to surgery. I will see her back before surgery. She was provided with a Flonase prescription today.     The risks, indications, alternatives and complications of doing this procedure were discussed with the patient.  She elected to proceed.          ____________________________________________________  Thierno Cano MD  Professor and Chief   Otology/Neurotology/Lateral Skull-Base Surgery   [facility]    Scribe Attestation  By signing my name below, I, Tracy Abreu, Scribe   attest that this documentation has been prepared under the direction and in the presence of Thierno Marrero MD.

## 2025-02-10 NOTE — H&P (VIEW-ONLY)
Reason for Consult:  Follow-up (Left ear pain)     Subjective   History Of Present Illness:  Amanda Banks is a 29 y.o. 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 The skin of the ear canal has healed well, at her last visit there was slight mucosalized drum anteriorly but overall dry.      On the right side, she continued to have conductive hearing loss. At her last visit, we discussed options of repair of the acquired stenosis vs. bone conduction implantation on the right vs. continuing observation. For now we are doing observation.     At her last visit on 12/17/2024, she had been having left ear drainage, muffled hearing and popping. She had evidence of AOM with mucoid drainage and a new small perforation. She was placed on augmentin, tobradex, and oral steroids. The left ear drainage resolved two weeks ago, but she is still struggling with her hearing on the left side. She is very bothered by her hearing being out of balance, and she is interested in options to help her hearing.     She now has a small pinpoint perforation and right stenosis of the EAC. She reports a popping sensation when not chewing, especially in the mornings. This is consistent with eustachian tube dysfunction.     Past Medical History:  She has a past medical history of Adverse effect of anesthesia, Arrhythmia, blood clots, and Migraines.    Surgical History:  She has a past surgical history that includes Other surgical history; Tympanostomy tube placement; Lumbar fusion; Cosmetic surgery; Appendectomy; and Other surgical history.     Social History:  She reports that she has never smoked. She has never used smokeless tobacco. She reports that she  "does not currently use alcohol. She reports that she does not use drugs.    Family History:  family history is not on file.     Medications:  Current Outpatient Medications   Medication Instructions    amitriptyline (ELAVIL) 10 mg, oral    ascorbic acid (VITAMIN C) 1,000 mg, Daily RT    DOCOSAHEXAENOIC ACID ORAL 1 capsule, Daily with breakfast    fluticasone (Flonase) 50 mcg/actuation nasal spray 2 sprays, Each Nostril, Daily, Shake gently. Before first use, prime pump. After use, clean tip and replace cap.    GARLIC ORAL oral    metoprolol tartrate (Lopressor) 50 mg tablet 2 tablets, Every 12 hours scheduled (0630,1830)    multivitamin with iron (Child Multivitamin Plus Iron) chewable tablet Chew.    naratriptan (AMERGE) 2.5 mg, oral, Once as needed    pantoprazole (PROTONIX) 40 mg    pediatric multivit 22-D3-vit K 5,000 unit- 1,000 mcg tablet,chewable Chew.    zinc gluconate 30 mg tablet Take by mouth.      Allergies:  Latex, natural rubber; Sulfa (sulfonamide antibiotics); and Oxycodone-acetaminophen    Review of Systems:   A comprehensive 10-point review of systems was obtained including constitutional, neurological, HEENT, pulmonary, cardiovascular, genito-urinary, and other pertinent systems and was negative except as noted in the HPI.     Objective   Physical Exam:  Last Recorded Vitals: Height 1.676 m (5' 6\"), weight 95.3 kg (210 lb).    On physical exam, the patient is a well-nourished, well-developed patient, in no acute distress, able to communicate without assistance in English language. Head and face is atraumatic and normocephalic. Salivary glands are intact. Facial strength is symmetrical bilaterally.       On ear examination:  Right ear: The patient has an open and patent ear canal. Acquired stenosis of ear canal ending in a blind pouch.    BC>AC  Left ear: The patient has an open and patent ear canal. The neotympanum  is thickened with a pinpoint perforation in the anterior inferior quadrant " .  AC>BC  The Gastelum is right    On vestibular exam, the patient has no spontaneous nystagmus, no headshake nystagmus, no head-thrust nystagmus, and no nystagmus on hyperventilation or Valsalva maneuvers. [place]-Hallpike maneuver is negative bilaterally.       On neuro exam, the patient is alert and oriented x3, cranial nerves are grossly intact, cerebellar exam is normal.      The rest of the exam, including anterior rhinoscopy, oropharyngeal exam, neck exam, and cardiovascular exam, were normal including no palpable lymphadenopathies, thyroid in the midline position, normal pulses, and normal chest excursion.       Reviewed Results:  Audiology Testing:  I personally reviewed the audiogram from 1/9/2025 that showed:  Right ear: moderate conductive hearing loss with  25-35dB ABG. Discrimination 92%.   Left ear: mild conductive hearing loss in the low and high frequencies with normal hearing in the mid frequencies, max ABG 15-20dB ABG at 500 and 4000 Hz. Discrimination 96%.       I personally reviewed the audiogram from 10/2024 that showed:            Right ear: mild conductive hearing loss with a 20db of air bone gap. Discrimination: 100%            Left ear: normal hearing . Discrimination: 100%       I reviewed her audiogram from 08/2023 that showed a mild conductive hearing loss on the left side with closure of the air-borne gap within 10dB. On the right side, she has a moderate conductive hearing loss in the low frequencies upsloping to mild levels in the mid and high frequencies. She has a 25dB of air-borne gap in the low and mid frequencies on the right side. She has 100% discrimination bilaterally.       I reviewed her audiogram from 06/2022 that shows mild conductive hearing loss on the right side and a normal hearing on the left side with a 10 decibel air-bone gap. She has 100% discrimination on the right and 96% on the left. This is significant improvement.          I reviewed and interpreted the patient's  audiogram 11/2021, which shows mild conductive hearing loss on the right side and moderate conductive hearing loss on the left side. The discrimination is 100% on the right and 96% on the left.      Imaging:  None     Procedure:  None    Assessment/Plan     1. 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 Conductive hearing loss, bilateral    3. Tympanic membrane perforation, left    4. Dysfunction of both eustachian tubes    5. Cleft palate and lip ([facility])        In summary, Amanda Banks is a 29 y.o. 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 The skin of the ear canal has healed well, there is a little bit of mucosalized drum anteriorly but it is dry.     On the right side, she continued to have conductive hearing loss. We continued to discuss options of repair of the acquired stenosis vs. bone conduction implantation on the right vs. continuing observation. She elected observation.    On the left side she has thickening of neotympanum with a pinpoint perforation. Her left ear was her better hearing ear. The left ear developed an acute infection with spontaneous rupture of the tympanic membrane in mid-December 2024. This has resolved, with a persistent small perforation and associated mild conductive hearing loss. We discussed that given her history of cleft palate, she will have chronic Eustachian tube dysfunction, and this perforation is acting as a physiologic ventilation pathway for her. Given its small size and better hearing status on the left, we do not recommend any intervention on the left.     She is bothered by the hearing loss she has. We discussed options for the right ear including surgical repair of the  acquired stenosis vs JESSI. She would like to wait on JESSI as a last resort for either ear. She would like to proceed with correction of the canal stenosis on the right. We will plan for right tympanoplasty with canalplasty and skin graft from the right arm.     The right ear has gotten worse. She is interested in repairing of her acquired stenosis on the right. She is scheduled for surgery on 02/25. She has a CT scheduled prior to surgery. I will see her back before surgery. She was provided with a Flonase prescription today.     The risks, indications, alternatives and complications of doing this procedure were discussed with the patient.  She elected to proceed.          ____________________________________________________  Thierno Cano MD  Professor and Chief   Otology/Neurotology/Lateral Skull-Base Surgery   [facility]    Scribe Attestation  By signing my name below, I, Tracy Abreu, Scribe   attest that this documentation has been prepared under the direction and in the presence of Thierno Marrero MD.

## 2025-02-10 NOTE — PREPROCEDURE INSTRUCTIONS
Pre-Operative Instructions &?Checklist      Your surgery has been scheduled at [facility] at [address] [place], [address], [address], in the [facility]. Parking is to the left of the main entrance.     You will be contacted about the time of your surgery the day before your surgery (if your surgery is on a Monday, you will be called the Friday before surgery). If you are unable to answer the phone, a detailed voicemail message will be left. Make sure that your voicemail box is not full so a message can be left. If you have not received a call by 3:00 pm you may call [phone] between the hours of 3:00 and 4:00 pm. Please be available by phone the night before/day of surgery in case there is a change in the schedule which may require you to arrive earlier/later.     ?     14 DAYS BEFORE SURGERY STOP TAKING WEIGHT LOSS MEDICATIONS      ?7 DAYS BEFORE SURGERY STOP THESE MEDICATIONS:       *Multiple Vitamins containing Vitamin E       * Herbal supplements, Fish Oil, garlic pills, turmeric, CoQ enzyme       *Stop taking aspirin, aspirin-containing products, and NSAID's like Advil, Motrin, Aleve, Ibuprofen, Meloxicam, Celecoxib, and Diclofenac.. Tylenol is okay to take for pain relief.        *If you are currently taking Coumadin/Warfarin, we will have to coordinate that with your PCP &/or the Anticoagulation Clinic.     THE DAY BEFORE SURGERY:       *Do not eat any food after midnight the night before surgery.        *You are permitted to have no more than 4 ounces of clear liquids such as water, apple juice, plain tea or coffee (no milk or creamer), clear electrolyte-replenishing         drinks such as Pedialyte, Gatorade, or Powerade  (not yogurt or pulp-containing smoothies or juices such as orange juice) up to 3 hours before your arrival time.     DAY OF SURGERY TAKE THESE MEDICATIONS (if it is not listed, do not take it.)    Take: Pantoprazole; metoprolol  If taking  medications in tablet/capsule form take with a small sip of water.     ON THE MORNING OF SURGERY:       *Shower either the night before your surgery or the morning of your surgery       *Do not use moisturizers, creams, lotions or perfume, or make-up.       *Wear comfortable, loose fitting clothing.        *All jewelry and valuables should be left at home.       *Prosthetic devices such as contact lenses, hearing aids, dentures, eyelash extensions, hairpins and body piercings must be removed before surgery. Bring         containers for eyeglasses/contacts, dentures, or hearing aids with you.     ? Diabetics: Please check fasting blood sugars upon waking up. ?If fasting blood sugars are <80ml/dl, please drink 100ml/3oz. of apple juice no later than 2 hours prior                    to surgery.     ?BRING WITH YOU:        *Photo ID and insurance card       *Current list of medicines and allergies       *Pacemaker/Defibrillator/Heart stent cards       *Copy of your complete Advanced Directive/DHPOA, or proof of guardianship-if applicable     ?SMOKING:       *Quitting smoking can make a huge difference to your health and recovery from surgery. ?       *If you need help with quitting, call 7-356TelovationsQUIT-NOW.       ALCOHOL:       *No alcoholic beverages for 48 hours before surgery.     ?AFTER OUTPATIENT SURGERY:       *A responsible adult MUST accompany you at the time of discharge and stay with you for 24 hours after your surgery.       *You may NOT drive yourself home after surgery.       *You may use a taxi or ride sharing service ([email or web address], Uber) to return home ONLY if you are accompanied by a friend or family member       *Instructions for resuming your medications will be provided by your surgeon.     CONTACT SURGEON'S OFFICE IF YOU DEVELOP:       *Fever =/>?100.4 F        *New respiratory symptoms (e.g. cough, shortness of breath, respiratory distress, sore throat)       *Recent loss of taste or smell       *Flu like  symptoms such as headache, fatigue or gastrointestinal symptoms       *If you develop any open sores, shingles, burning or painful urination    AND/OR:       *You no longer wish to have the surgery.       *Any other personal circumstances change that may lead to the need to cancel or defer this surgery.       *You were admitted to any hospital within one week of your planned procedure.     ?If you have any questions regarding these preoperative instructions, you may call [phone]. If you have questions regarding you surgical procedure, or post-operative care/recovery please call your surgeon's office.     Link to [facility] Laboratory Services Locations   [email or web address]     Link to [facility]   [email or web address]

## 2025-02-10 NOTE — Clinical Note
February 14, 2025     No Recipients    Patient: Amanda Banks   YOB: 1995   Date of Visit: 2/10/2025       Dear Dr. Siegel Recipients:    Thank you for referring Amanda Banks to me for evaluation. Below are my notes for this consultation.  If you have questions, please do not hesitate to call me. I look forward to following your patient along with you.       Sincerely,     Thierno Marrero MD      CC: No Recipients  ______________________________________________________________________________________            Reason for Consult:  Follow-up (Left ear pain)     Subjective  History Of Present Illness:  Amanda Banks is a 29 y.o. 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 The skin of the ear canal has healed well, at her last visit there was slight mucosalized drum anteriorly but overall dry.      On the right side, she continued to have conductive hearing loss. At her last visit, we discussed options of repair of the acquired stenosis vs. bone conduction implantation on the right vs. continuing observation. For now we are doing observation.     At her last visit on 12/17/2024, she had been having left ear drainage, muffled hearing and popping. She had evidence of AOM with mucoid drainage and a new small perforation. She was placed on augmentin, tobradex, and oral steroids. The left ear drainage resolved two weeks ago, but she is still struggling with her hearing on the left side. She is very bothered by her hearing being out of balance, and she is interested in options to help her hearing.     She now has a small pinpoint perforation and right stenosis of the EAC. She reports a popping sensation when not  "chewing, especially in the mornings. This is consistent with eustachian tube dysfunction.     Past Medical History:  She has a past medical history of Adverse effect of anesthesia, Arrhythmia, blood clots, and Migraines.    Surgical History:  She has a past surgical history that includes Other surgical history; Tympanostomy tube placement; Lumbar fusion; Cosmetic surgery; Appendectomy; and Other surgical history.     Social History:  She reports that she has never smoked. She has never used smokeless tobacco. She reports that she does not currently use alcohol. She reports that she does not use drugs.    Family History:  family history is not on file.     Medications:  Current Outpatient Medications   Medication Instructions    amitriptyline (ELAVIL) 10 mg, oral    ascorbic acid (VITAMIN C) 1,000 mg, Daily RT    DOCOSAHEXAENOIC ACID ORAL 1 capsule, Daily with breakfast    fluticasone (Flonase) 50 mcg/actuation nasal spray 2 sprays, Each Nostril, Daily, Shake gently. Before first use, prime pump. After use, clean tip and replace cap.    GARLIC ORAL oral    metoprolol tartrate (Lopressor) 50 mg tablet 2 tablets, Every 12 hours scheduled (0630,1830)    multivitamin with iron (Child Multivitamin Plus Iron) chewable tablet Chew.    naratriptan (AMERGE) 2.5 mg, oral, Once as needed    pantoprazole (PROTONIX) 40 mg    pediatric multivit 22-D3-vit K 5,000 unit- 1,000 mcg tablet,chewable Chew.    zinc gluconate 30 mg tablet Take by mouth.      Allergies:  Latex, natural rubber; Sulfa (sulfonamide antibiotics); and Oxycodone-acetaminophen    Review of Systems:   A comprehensive 10-point review of systems was obtained including constitutional, neurological, HEENT, pulmonary, cardiovascular, genito-urinary, and other pertinent systems and was negative except as noted in the HPI.     Objective  Physical Exam:  Last Recorded Vitals: Height 1.676 m (5' 6\"), weight 95.3 kg (210 lb).    On physical exam, the patient is a " well-nourished, well-developed patient, in no acute distress, able to communicate without assistance in English language. Head and face is atraumatic and normocephalic. Salivary glands are intact. Facial strength is symmetrical bilaterally.       On ear examination:  Right ear: The patient has an open and patent ear canal. Acquired stenosis of ear canal ending in a blind pouch.    BC>AC  Left ear: The patient has an open and patent ear canal. The neotympanum  is thickened with a pinpoint perforation in the anterior inferior quadrant .  AC>BC  The Gastelum is right    On vestibular exam, the patient has no spontaneous nystagmus, no headshake nystagmus, no head-thrust nystagmus, and no nystagmus on hyperventilation or Valsalva maneuvers. Nga-Hallpike maneuver is negative bilaterally.       On neuro exam, the patient is alert and oriented x3, cranial nerves are grossly intact, cerebellar exam is normal.      The rest of the exam, including anterior rhinoscopy, oropharyngeal exam, neck exam, and cardiovascular exam, were normal including no palpable lymphadenopathies, thyroid in the midline position, normal pulses, and normal chest excursion.       Reviewed Results:  Audiology Testing:  I personally reviewed the audiogram from 1/9/2025 that showed:  Right ear: moderate conductive hearing loss with  25-35dB ABG. Discrimination 92%.   Left ear: mild conductive hearing loss in the low and high frequencies with normal hearing in the mid frequencies, max ABG 15-20dB ABG at 500 and 4000 Hz. Discrimination 96%.       I personally reviewed the audiogram from 10/2024 that showed:            Right ear: mild conductive hearing loss with a 20db of air bone gap. Discrimination: 100%            Left ear: normal hearing . Discrimination: 100%       I reviewed her audiogram from 08/2023 that showed a mild conductive hearing loss on the left side with closure of the air-borne gap within 10dB. On the right side, she has a moderate  conductive hearing loss in the low frequencies upsloping to mild levels in the mid and high frequencies. She has a 25dB of air-borne gap in the low and mid frequencies on the right side. She has 100% discrimination bilaterally.       I reviewed her audiogram from 06/2022 that shows mild conductive hearing loss on the right side and a normal hearing on the left side with a 10 decibel air-bone gap. She has 100% discrimination on the right and 96% on the left. This is significant improvement.          I reviewed and interpreted the patient's audiogram 11/2021, which shows mild conductive hearing loss on the right side and moderate conductive hearing loss on the left side. The discrimination is 100% on the right and 96% on the left.      Imaging:  None     Procedure:  None    Assessment/Plan    1. 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 Conductive hearing loss, bilateral    3. Tympanic membrane perforation, left    4. Dysfunction of both eustachian tubes    5. Cleft palate and lip ([facility])        In summary, Amanda Banks is a 29 y.o. 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 The skin of the ear canal has healed well, there is a little bit of mucosalized drum anteriorly but it is dry.     On the right side, she continued to have conductive hearing loss. We continued to discuss options of repair of the acquired stenosis vs. bone conduction implantation on the right vs. continuing observation. She elected observation.    On the left side she has thickening of neotympanum with a pinpoint perforation. Her left ear was her better hearing ear. The left ear developed an acute infection with spontaneous rupture of the tympanic  membrane in mid-December 2024. This has resolved, with a persistent small perforation and associated mild conductive hearing loss. We discussed that given her history of cleft palate, she will have chronic Eustachian tube dysfunction, and this perforation is acting as a physiologic ventilation pathway for her. Given its small size and better hearing status on the left, we do not recommend any intervention on the left.     She is bothered by the hearing loss she has. We discussed options for the right ear including surgical repair of the acquired stenosis vs JESSI. She would like to wait on JESSI as a last resort for either ear. She would like to proceed with correction of the canal stenosis on the right. We will plan for right tympanoplasty with canalplasty and skin graft from the right arm.     The right ear has gotten worse. She is interested in repairing of her acquired stenosis on the right. She is scheduled for surgery on 02/25. She has a CT scheduled prior to surgery. I will see her back before surgery. She was provided with a Flonase prescription today.     The risks, indications, alternatives and complications of doing this procedure were discussed with the patient.  She elected to proceed.          ____________________________________________________  Thierno Cano MD  Professor and Chief   Otology/Neurotology/Lateral Skull-Base Surgery   [facility]    Scribe Attestation  By signing my name below, I, Tracy Abreu, Scribe   attest that this documentation has been prepared under the direction and in the presence of Thierno Marrero MD.

## 2025-02-11 ASSESSMENT — ENCOUNTER SYMPTOMS
HEADACHES: 1
PALPITATIONS: 1

## 2025-02-11 NOTE — CPM/PAT H&P
CPM/PAT Evaluation       Name: Amanda Banks   /Age: 1995       TELEMEDICINE ENCOUNTER  Patient was interviewed by telephone for preadmission testing perioperative risk assessment prior to surgery.    DATE OF CONSULT: 02/10/2025  REFERRING PROVIDER: Dr. Thierno Cano  SURGERY, DATE, AND LENGTH: Repair of right ear canal atresia/stenosis, tympanoplasty, canalplasty, full-thickness skin graft from arm, cartilage graft, possible OCR; 2025; 270 minutes    CHIEF COMPLAINT  Acquired stenosis of both external ear canals    HPI  Amanda Banks 29-year-old female with stenosis and lateralization of both eardrums with bilateral conductive hearing loss worse on the left than right.  This is why in 2022 she underwent a left-sided tympanoplasty and canalplasty with reconstruction of acquired stenosis of the ear canal with skin graft from left arm.  She has healed well from that surgery, but continues to have conductive hearing loss on the right side.  She has been referred to preadmission testing in anticipation of a right tympanoplasty, canalplasty and repair of ear canal atresia/stenosis.  Patient with medical history significant for PSVT maintained on metoprolol; migraines; history of cleft palate/lip with repair.     ACTIVE PROBLEMS  Patient Active Problem List   Diagnosis    Acquired stenosis of both external ear canals    Impacted cerumen of left ear    Acute allergic mucoid otitis media of right ear    Acute suppurative otitis media of left ear with spontaneous rupture of tympanic membrane    Conductive hearing loss, bilateral    Tympanic membrane perforation, left    Dysfunction of both eustachian tubes    Cleft palate and lip ([facility])        PAST MEDICAL HISTORY  Past Medical History:   Diagnosis Date    Adverse effect of anesthesia     Patient reports receiving too much anesthesia with breast reduction surgery that required her to use a CPAP machine in PACU    Arrhythmia     Paroxysmal SVT     Hx of blood clots     Upper extremity secondary to oral birth control pill    Migraines         SURGICAL HISTORY  Past Surgical History:   Procedure Laterality Date    APPENDECTOMY      COSMETIC SURGERY      Bilateral breast reduction    LUMBAR FUSION      OTHER SURGICAL HISTORY      History of cleft lip repair    OTHER SURGICAL HISTORY      left-sided tympanoplasty and canaloplasty    TYMPANOSTOMY TUBE PLACEMENT          ANESTHESIA HISTORY  Patient reports that with her breast reduction surgery she was given too much anesthesia which required an extended recovery in PACU with a CPAP machine.  She also reports having PONV.  Denies other problems with anesthesia in the past such as awareness, dental damage, aspiration, cardiac arrest, difficult intubation, or unexpected hospital admissions. Denies family history of malignant hyperthermia, or pseudocholinesterase deficiency.      SOCIAL HISTORY  Never smoker; rare alcohol use; no recreational drug use.  Patient states she exercises regularly and is able to do moderate ADLs without shortness of breath or chest pain.  METS 4    FAMILY HISTORY  No family history on file.     ALLERGIES  Allergies   Allergen Reactions    Latex, Natural Rubber Anaphylaxis, Unknown, Shortness of breath and Swelling    Sulfa (Sulfonamide Antibiotics) Anaphylaxis    Oxycodone-Acetaminophen Itching     Percocet not oxy        MEDICATIONS  No current facility-administered medications for this encounter.    Current Outpatient Medications:     ascorbic acid (Vitamin C) 1,000 mg tablet, Take 1 tablet (1,000 mg) by mouth once daily., Disp: , Rfl:     DOCOSAHEXAENOIC ACID ORAL, Take 1 capsule by mouth once daily with breakfast., Disp: , Rfl:     metoprolol tartrate (Lopressor) 50 mg tablet, Take 2 tablets by mouth every 12 hours., Disp: , Rfl:     multivitamin with iron (Child Multivitamin Plus Iron) chewable tablet, Chew., Disp: , Rfl:     pantoprazole (ProtoNix) 40 mg EC tablet, Take 1 tablet (40  mg) by mouth., Disp: , Rfl:     amitriptyline (Elavil) 10 mg tablet, Take 1 tablet (10 mg) by mouth., Disp: , Rfl:     fluticasone (Flonase) 50 mcg/actuation nasal spray, Administer 2 sprays into each nostril once daily. Shake gently. Before first use, prime pump. After use, clean tip and replace cap., Disp: 16 g, Rfl: 11    GARLIC ORAL, Take by mouth., Disp: , Rfl:     naratriptan (Amerge) 2.5 mg tablet, Take 1 tablet (2.5 mg) by mouth 1 time if needed for migraine., Disp: , Rfl:     pediatric multivit 22-D3-vit K 5,000 unit- 1,000 mcg tablet,chewable, Chew., Disp: , Rfl:     zinc gluconate 30 mg tablet, Take by mouth., Disp: , Rfl:      REVIEW OF SYSTEMS  Review of Systems   HENT:  Positive for hearing loss (Stenosis of external auditory canal).    Cardiovascular:  Positive for palpitations (PSVT).   Neurological:  Positive for headaches.   All other systems reviewed and are negative.    STOP BANG:  Negative for SARAI    PHYSICAL EXAM  Deferred    AIRWAY EXAM  Deferred    VITALS  No vitals taken for telemedicine visit  BMI Readings from Last 1 Encounters:   02/10/25 33.89 kg/m²      BP Readings from Last 4 Encounters:   No data found for BP        LABS  Lab Results   Component Value Date    WBC 8.7 02/02/2022    HGB 11.8 (L) 02/02/2022    HCT 34.9 (L) 02/02/2022    MCV 78 (L) 02/02/2022     02/02/2022      Lab Results   Component Value Date    GLUCOSE 97 02/02/2022    CALCIUM 9.7 02/02/2022     02/02/2022    K 4.3 02/02/2022    CO2 27 02/02/2022     02/02/2022    BUN 8 02/02/2022    CREATININE 0.70 02/02/2022      CBC with differential, and CMP from 02/06/2025  Results essentially within normal limits    IMAGING  ECG from 02/02/2022  Normal sinus rhythm  Normal ECG      ASSESSMENT/PLAN  Bilateral acquired stenosis of ear canal  Repair of right ear canal atresia/stenosis, tympanoplasty, canalplasty      Preoperative instructions reviewed in detail with patient during this encounter. A copy of these  instructions has been unloaded to  [facility] along with a copy sent to either home email address or mailed to home address.  This note was created in part upon personal review of patient's medical records.  Speech recognition transcription software was used in the creation of this note. Despite proofreading, several typographical errors might be present that might affect the meaning of the content.

## 2025-02-21 ENCOUNTER — HOSPITAL ENCOUNTER (OUTPATIENT)
Dept: RADIOLOGY | Facility: CLINIC | Age: 30
Discharge: HOME | End: 2025-02-21
Payer: COMMERCIAL

## 2025-02-21 DIAGNOSIS — H72.92 TYMPANIC MEMBRANE PERFORATION, LEFT: ICD-10-CM

## 2025-02-21 PROCEDURE — 70480 CT ORBIT/EAR/FOSSA W/O DYE: CPT

## 2025-02-24 ENCOUNTER — ANESTHESIA EVENT (OUTPATIENT)
Dept: OPERATING ROOM | Facility: CLINIC | Age: 30
End: 2025-02-24
Payer: COMMERCIAL

## 2025-02-24 RX ORDER — LIDOCAINE HYDROCHLORIDE 10 MG/ML
0.1 INJECTION, SOLUTION EPIDURAL; INFILTRATION; INTRACAUDAL; PERINEURAL ONCE
Status: CANCELLED | OUTPATIENT
Start: 2025-02-24 | End: 2025-02-24

## 2025-02-25 ENCOUNTER — HOSPITAL ENCOUNTER (OUTPATIENT)
Facility: CLINIC | Age: 30
Setting detail: OUTPATIENT SURGERY
Discharge: HOME | End: 2025-02-25
Attending: OTOLARYNGOLOGY | Admitting: OTOLARYNGOLOGY
Payer: COMMERCIAL

## 2025-02-25 ENCOUNTER — ANESTHESIA (OUTPATIENT)
Dept: OPERATING ROOM | Facility: CLINIC | Age: 30
End: 2025-02-25
Payer: COMMERCIAL

## 2025-02-25 VITALS
HEIGHT: 66 IN | TEMPERATURE: 97.3 F | SYSTOLIC BLOOD PRESSURE: 118 MMHG | WEIGHT: 212.08 LBS | HEART RATE: 90 BPM | BODY MASS INDEX: 34.08 KG/M2 | DIASTOLIC BLOOD PRESSURE: 71 MMHG | RESPIRATION RATE: 16 BRPM | OXYGEN SATURATION: 96 %

## 2025-02-25 DIAGNOSIS — H61.303 ACQUIRED STENOSIS OF BOTH EXTERNAL EAR CANALS: Primary | ICD-10-CM

## 2025-02-25 PROBLEM — K21.9 GASTROESOPHAGEAL REFLUX DISEASE WITHOUT ESOPHAGITIS: Status: ACTIVE | Noted: 2025-02-25

## 2025-02-25 PROBLEM — I47.19 OTHER SUPRAVENTRICULAR TACHYCARDIA: Status: ACTIVE | Noted: 2025-02-25

## 2025-02-25 LAB — PREGNANCY TEST URINE, POC: NEGATIVE

## 2025-02-25 PROCEDURE — 2500000004 HC RX 250 GENERAL PHARMACY W/ HCPCS (ALT 636 FOR OP/ED): Performed by: ANESTHESIOLOGIST ASSISTANT

## 2025-02-25 PROCEDURE — 2500000002 HC RX 250 W HCPCS SELF ADMINISTERED DRUGS (ALT 637 FOR MEDICARE OP, ALT 636 FOR OP/ED): Performed by: OTOLARYNGOLOGY

## 2025-02-25 PROCEDURE — 2500000001 HC RX 250 WO HCPCS SELF ADMINISTERED DRUGS (ALT 637 FOR MEDICARE OP): Performed by: ANESTHESIOLOGY

## 2025-02-25 PROCEDURE — 2500000004 HC RX 250 GENERAL PHARMACY W/ HCPCS (ALT 636 FOR OP/ED): Performed by: OTOLARYNGOLOGY

## 2025-02-25 PROCEDURE — 2500000001 HC RX 250 WO HCPCS SELF ADMINISTERED DRUGS (ALT 637 FOR MEDICARE OP): Performed by: OTOLARYNGOLOGY

## 2025-02-25 PROCEDURE — 3700000001 HC GENERAL ANESTHESIA TIME - INITIAL BASE CHARGE: Performed by: OTOLARYNGOLOGY

## 2025-02-25 PROCEDURE — A69635 PR TYMPANOPLAS/ANTROTOMY: Performed by: ANESTHESIOLOGY

## 2025-02-25 PROCEDURE — 3700000002 HC GENERAL ANESTHESIA TIME - EACH INCREMENTAL 1 MINUTE: Performed by: OTOLARYNGOLOGY

## 2025-02-25 PROCEDURE — 7100000009 HC PHASE TWO TIME - INITIAL BASE CHARGE: Performed by: OTOLARYNGOLOGY

## 2025-02-25 PROCEDURE — A69635 PR TYMPANOPLAS/ANTROTOMY: Performed by: ANESTHESIOLOGIST ASSISTANT

## 2025-02-25 PROCEDURE — 2720000007 HC OR 272 NO HCPCS: Performed by: OTOLARYNGOLOGY

## 2025-02-25 PROCEDURE — 7100000002 HC RECOVERY ROOM TIME - EACH INCREMENTAL 1 MINUTE: Performed by: OTOLARYNGOLOGY

## 2025-02-25 PROCEDURE — 2500000005 HC RX 250 GENERAL PHARMACY W/O HCPCS: Performed by: OTOLARYNGOLOGY

## 2025-02-25 PROCEDURE — 7100000010 HC PHASE TWO TIME - EACH INCREMENTAL 1 MINUTE: Performed by: OTOLARYNGOLOGY

## 2025-02-25 PROCEDURE — 3600000008 HC OR TIME - EACH INCREMENTAL 1 MINUTE - PROCEDURE LEVEL THREE: Performed by: OTOLARYNGOLOGY

## 2025-02-25 PROCEDURE — 2500000005 HC RX 250 GENERAL PHARMACY W/O HCPCS: Performed by: ANESTHESIOLOGIST ASSISTANT

## 2025-02-25 PROCEDURE — 3600000003 HC OR TIME - INITIAL BASE CHARGE - PROCEDURE LEVEL THREE: Performed by: OTOLARYNGOLOGY

## 2025-02-25 PROCEDURE — 7100000001 HC RECOVERY ROOM TIME - INITIAL BASE CHARGE: Performed by: OTOLARYNGOLOGY

## 2025-02-25 DEVICE — EAR PACK, AMBRUS, 15MM X 20MM: Type: IMPLANTABLE DEVICE | Site: EAR | Status: NON-FUNCTIONAL

## 2025-02-25 RX ORDER — POLYETHYLENE GLYCOL 3350 17 G/17G
17 POWDER, FOR SOLUTION ORAL DAILY
Qty: 7 PACKET | Refills: 0 | Status: SHIPPED | OUTPATIENT
Start: 2025-02-25 | End: 2025-03-04

## 2025-02-25 RX ORDER — SODIUM CHLORIDE, SODIUM LACTATE, POTASSIUM CHLORIDE, CALCIUM CHLORIDE 600; 310; 30; 20 MG/100ML; MG/100ML; MG/100ML; MG/100ML
INJECTION, SOLUTION INTRAVENOUS CONTINUOUS PRN
Status: DISCONTINUED | OUTPATIENT
Start: 2025-02-25 | End: 2025-02-25

## 2025-02-25 RX ORDER — ONDANSETRON HYDROCHLORIDE 2 MG/ML
INJECTION, SOLUTION INTRAVENOUS AS NEEDED
Status: DISCONTINUED | OUTPATIENT
Start: 2025-02-25 | End: 2025-02-25

## 2025-02-25 RX ORDER — LABETALOL HYDROCHLORIDE 5 MG/ML
5 INJECTION, SOLUTION INTRAVENOUS ONCE AS NEEDED
Status: DISCONTINUED | OUTPATIENT
Start: 2025-02-25 | End: 2025-02-25 | Stop reason: HOSPADM

## 2025-02-25 RX ORDER — MIDAZOLAM HYDROCHLORIDE 1 MG/ML
INJECTION, SOLUTION INTRAMUSCULAR; INTRAVENOUS AS NEEDED
Status: DISCONTINUED | OUTPATIENT
Start: 2025-02-25 | End: 2025-02-25

## 2025-02-25 RX ORDER — SODIUM CHLORIDE 0.9 G/100ML
INJECTION, SOLUTION IRRIGATION AS NEEDED
Status: DISCONTINUED | OUTPATIENT
Start: 2025-02-25 | End: 2025-02-25 | Stop reason: HOSPADM

## 2025-02-25 RX ORDER — FENTANYL CITRATE 50 UG/ML
25 INJECTION, SOLUTION INTRAMUSCULAR; INTRAVENOUS EVERY 5 MIN PRN
Status: DISCONTINUED | OUTPATIENT
Start: 2025-02-25 | End: 2025-02-25 | Stop reason: HOSPADM

## 2025-02-25 RX ORDER — ONDANSETRON 4 MG/1
4 TABLET, FILM COATED ORAL EVERY 8 HOURS PRN
Qty: 9 TABLET | Refills: 0 | Status: SHIPPED | OUTPATIENT
Start: 2025-02-25 | End: 2025-02-28

## 2025-02-25 RX ORDER — FENTANYL CITRATE 50 UG/ML
INJECTION, SOLUTION INTRAMUSCULAR; INTRAVENOUS AS NEEDED
Status: DISCONTINUED | OUTPATIENT
Start: 2025-02-25 | End: 2025-02-25

## 2025-02-25 RX ORDER — OXYCODONE HYDROCHLORIDE 5 MG/1
5 TABLET ORAL EVERY 6 HOURS PRN
Qty: 28 TABLET | Refills: 0 | Status: SHIPPED | OUTPATIENT
Start: 2025-02-25 | End: 2025-03-04

## 2025-02-25 RX ORDER — LIDOCAINE HYDROCHLORIDE AND EPINEPHRINE 10; 20 UG/ML; MG/ML
INJECTION, SOLUTION INFILTRATION; PERINEURAL AS NEEDED
Status: DISCONTINUED | OUTPATIENT
Start: 2025-02-25 | End: 2025-02-25 | Stop reason: HOSPADM

## 2025-02-25 RX ORDER — FENTANYL CITRATE 50 UG/ML
50 INJECTION, SOLUTION INTRAMUSCULAR; INTRAVENOUS EVERY 5 MIN PRN
Status: DISCONTINUED | OUTPATIENT
Start: 2025-02-25 | End: 2025-02-25 | Stop reason: HOSPADM

## 2025-02-25 RX ORDER — SUCCINYLCHOLINE CHLORIDE 100 MG/5ML
SYRINGE (ML) INTRAVENOUS AS NEEDED
Status: DISCONTINUED | OUTPATIENT
Start: 2025-02-25 | End: 2025-02-25

## 2025-02-25 RX ORDER — PHENYLEPHRINE HYDROCHLORIDE 10 MG/ML
INJECTION INTRAVENOUS AS NEEDED
Status: DISCONTINUED | OUTPATIENT
Start: 2025-02-25 | End: 2025-02-25

## 2025-02-25 RX ORDER — CIPROFLOXACIN AND DEXAMETHASONE 3; 1 MG/ML; MG/ML
4 SUSPENSION/ DROPS AURICULAR (OTIC) 2 TIMES DAILY
Qty: 7.5 ML | Refills: 0 | Status: SHIPPED | OUTPATIENT
Start: 2025-02-25 | End: 2025-03-04

## 2025-02-25 RX ORDER — OXYCODONE HYDROCHLORIDE 5 MG/1
5 TABLET ORAL ONCE
Status: COMPLETED | OUTPATIENT
Start: 2025-02-25 | End: 2025-02-25

## 2025-02-25 RX ORDER — CEPHALEXIN 500 MG/1
500 CAPSULE ORAL 3 TIMES DAILY
Qty: 21 CAPSULE | Refills: 0 | Status: SHIPPED | OUTPATIENT
Start: 2025-02-25 | End: 2025-03-04

## 2025-02-25 RX ORDER — EPINEPHRINE 1 MG/ML
INJECTION, SOLUTION, CONCENTRATE INTRAVENOUS AS NEEDED
Status: DISCONTINUED | OUTPATIENT
Start: 2025-02-25 | End: 2025-02-25 | Stop reason: HOSPADM

## 2025-02-25 RX ORDER — ONDANSETRON HYDROCHLORIDE 2 MG/ML
4 INJECTION, SOLUTION INTRAVENOUS ONCE AS NEEDED
Status: DISCONTINUED | OUTPATIENT
Start: 2025-02-25 | End: 2025-02-25 | Stop reason: HOSPADM

## 2025-02-25 RX ORDER — SODIUM CHLORIDE, SODIUM LACTATE, POTASSIUM CHLORIDE, CALCIUM CHLORIDE 600; 310; 30; 20 MG/100ML; MG/100ML; MG/100ML; MG/100ML
100 INJECTION, SOLUTION INTRAVENOUS CONTINUOUS
Status: DISCONTINUED | OUTPATIENT
Start: 2025-02-25 | End: 2025-02-25 | Stop reason: HOSPADM

## 2025-02-25 RX ORDER — MUPIROCIN 20 MG/G
OINTMENT TOPICAL AS NEEDED
Status: DISCONTINUED | OUTPATIENT
Start: 2025-02-25 | End: 2025-02-25 | Stop reason: HOSPADM

## 2025-02-25 RX ORDER — KETOROLAC TROMETHAMINE 30 MG/ML
INJECTION, SOLUTION INTRAMUSCULAR; INTRAVENOUS AS NEEDED
Status: DISCONTINUED | OUTPATIENT
Start: 2025-02-25 | End: 2025-02-25

## 2025-02-25 RX ORDER — CEFAZOLIN 1 G/1
INJECTION, POWDER, FOR SOLUTION INTRAVENOUS AS NEEDED
Status: DISCONTINUED | OUTPATIENT
Start: 2025-02-25 | End: 2025-02-25

## 2025-02-25 RX ORDER — NORETHINDRONE AND ETHINYL ESTRADIOL 0.5-0.035
KIT ORAL AS NEEDED
Status: DISCONTINUED | OUTPATIENT
Start: 2025-02-25 | End: 2025-02-25

## 2025-02-25 RX ORDER — LIDOCAINE HYDROCHLORIDE 20 MG/ML
INJECTION, SOLUTION INFILTRATION; PERINEURAL AS NEEDED
Status: DISCONTINUED | OUTPATIENT
Start: 2025-02-25 | End: 2025-02-25

## 2025-02-25 RX ORDER — LIDOCAINE HYDROCHLORIDE 40 MG/ML
SOLUTION TOPICAL AS NEEDED
Status: DISCONTINUED | OUTPATIENT
Start: 2025-02-25 | End: 2025-02-25

## 2025-02-25 RX ORDER — CIPROFLOXACIN AND DEXAMETHASONE 3; 1 MG/ML; MG/ML
SUSPENSION/ DROPS AURICULAR (OTIC) AS NEEDED
Status: DISCONTINUED | OUTPATIENT
Start: 2025-02-25 | End: 2025-02-25 | Stop reason: HOSPADM

## 2025-02-25 RX ORDER — PROPOFOL 10 MG/ML
INJECTION, EMULSION INTRAVENOUS AS NEEDED
Status: DISCONTINUED | OUTPATIENT
Start: 2025-02-25 | End: 2025-02-25

## 2025-02-25 RX ADMIN — EPHEDRINE SULFATE 5 MG: 50 INJECTION INTRAVENOUS at 07:57

## 2025-02-25 RX ADMIN — KETOROLAC TROMETHAMINE 15 MG: 30 INJECTION, SOLUTION INTRAMUSCULAR; INTRAVENOUS at 11:54

## 2025-02-25 RX ADMIN — EPHEDRINE SULFATE 10 MG: 50 INJECTION INTRAVENOUS at 08:06

## 2025-02-25 RX ADMIN — LIDOCAINE HYDROCHLORIDE 80 MG: 20 INJECTION, SOLUTION INFILTRATION; PERINEURAL at 07:44

## 2025-02-25 RX ADMIN — FENTANYL CITRATE 50 MCG: 0.05 INJECTION, SOLUTION INTRAMUSCULAR; INTRAVENOUS at 08:17

## 2025-02-25 RX ADMIN — PHENYLEPHRINE HYDROCHLORIDE 80 MCG: 10 INJECTION INTRAVENOUS at 08:42

## 2025-02-25 RX ADMIN — MIDAZOLAM 1 MG: 1 INJECTION INTRAMUSCULAR; INTRAVENOUS at 07:40

## 2025-02-25 RX ADMIN — PHENYLEPHRINE HYDROCHLORIDE 80 MCG: 10 INJECTION INTRAVENOUS at 07:55

## 2025-02-25 RX ADMIN — Medication 100 MG: at 07:44

## 2025-02-25 RX ADMIN — EPHEDRINE SULFATE 10 MG: 50 INJECTION INTRAVENOUS at 08:28

## 2025-02-25 RX ADMIN — CEFAZOLIN 2 G: 1 INJECTION, POWDER, FOR SOLUTION INTRAMUSCULAR; INTRAVENOUS at 11:48

## 2025-02-25 RX ADMIN — PROPOFOL 25 MCG/KG/MIN: 10 INJECTION, EMULSION INTRAVENOUS at 07:50

## 2025-02-25 RX ADMIN — PHENYLEPHRINE HYDROCHLORIDE 80 MCG: 10 INJECTION INTRAVENOUS at 08:06

## 2025-02-25 RX ADMIN — CEFAZOLIN 2 G: 1 INJECTION, POWDER, FOR SOLUTION INTRAMUSCULAR; INTRAVENOUS at 07:51

## 2025-02-25 RX ADMIN — LIDOCAINE HYDROCHLORIDE 4 ML: 40 SOLUTION TOPICAL at 07:46

## 2025-02-25 RX ADMIN — EPHEDRINE SULFATE 5 MG: 50 INJECTION INTRAVENOUS at 07:59

## 2025-02-25 RX ADMIN — EPHEDRINE SULFATE 10 MG: 50 INJECTION INTRAVENOUS at 09:27

## 2025-02-25 RX ADMIN — OXYCODONE HYDROCHLORIDE 5 MG: 5 TABLET ORAL at 14:10

## 2025-02-25 RX ADMIN — PHENYLEPHRINE HYDROCHLORIDE 80 MCG: 10 INJECTION INTRAVENOUS at 08:55

## 2025-02-25 RX ADMIN — PHENYLEPHRINE HYDROCHLORIDE 80 MCG: 10 INJECTION INTRAVENOUS at 08:29

## 2025-02-25 RX ADMIN — SODIUM CHLORIDE, POTASSIUM CHLORIDE, SODIUM LACTATE AND CALCIUM CHLORIDE: 600; 310; 30; 20 INJECTION, SOLUTION INTRAVENOUS at 07:38

## 2025-02-25 RX ADMIN — EPHEDRINE SULFATE 10 MG: 50 INJECTION INTRAVENOUS at 08:41

## 2025-02-25 RX ADMIN — FENTANYL CITRATE 50 MCG: 0.05 INJECTION, SOLUTION INTRAMUSCULAR; INTRAVENOUS at 12:38

## 2025-02-25 RX ADMIN — PROPOFOL 150 MG: 10 INJECTION, EMULSION INTRAVENOUS at 07:44

## 2025-02-25 RX ADMIN — ONDANSETRON 4 MG: 2 INJECTION INTRAMUSCULAR; INTRAVENOUS at 08:30

## 2025-02-25 RX ADMIN — PHENYLEPHRINE HYDROCHLORIDE 80 MCG: 10 INJECTION INTRAVENOUS at 08:17

## 2025-02-25 RX ADMIN — DEXAMETHASONE SODIUM PHOSPHATE 10 MG: 4 INJECTION INTRA-ARTICULAR; INTRALESIONAL; INTRAMUSCULAR; INTRAVENOUS; SOFT TISSUE at 07:57

## 2025-02-25 RX ADMIN — FENTANYL CITRATE 50 MCG: 0.05 INJECTION, SOLUTION INTRAMUSCULAR; INTRAVENOUS at 07:44

## 2025-02-25 RX ADMIN — MIDAZOLAM 1 MG: 1 INJECTION INTRAMUSCULAR; INTRAVENOUS at 07:44

## 2025-02-25 SDOH — HEALTH STABILITY: MENTAL HEALTH: CURRENT SMOKER: 0

## 2025-02-25 ASSESSMENT — PAIN SCALES - GENERAL
PAINLEVEL_OUTOF10: 4
PAINLEVEL_OUTOF10: 0 - NO PAIN
PAINLEVEL_OUTOF10: 0 - NO PAIN
PAINLEVEL_OUTOF10: 6
PAINLEVEL_OUTOF10: 0 - NO PAIN
PAIN_LEVEL: 1
PAINLEVEL_OUTOF10: 0 - NO PAIN
PAINLEVEL_OUTOF10: 0 - NO PAIN

## 2025-02-25 ASSESSMENT — COLUMBIA-SUICIDE SEVERITY RATING SCALE - C-SSRS
2. HAVE YOU ACTUALLY HAD ANY THOUGHTS OF KILLING YOURSELF?: NO
1. IN THE PAST MONTH, HAVE YOU WISHED YOU WERE DEAD OR WISHED YOU COULD GO TO SLEEP AND NOT WAKE UP?: NO
6. HAVE YOU EVER DONE ANYTHING, STARTED TO DO ANYTHING, OR PREPARED TO DO ANYTHING TO END YOUR LIFE?: NO

## 2025-02-25 ASSESSMENT — PAIN - FUNCTIONAL ASSESSMENT
PAIN_FUNCTIONAL_ASSESSMENT: 0-10

## 2025-02-25 ASSESSMENT — PAIN DESCRIPTION - ORIENTATION: ORIENTATION: RIGHT

## 2025-02-25 ASSESSMENT — PAIN DESCRIPTION - LOCATION: LOCATION: EAR

## 2025-02-25 NOTE — DISCHARGE INSTRUCTIONS
POST OPERATIVE INSTRUCTIONS AFTER EAR SURGERY  Joseline Aguilar MD ? Thierno Cano MD ? Bairon Arceo MD? Ashutosh Yang MD    Most ear surgeries should have a 2-4 week postoperative appointment. Please be sure to call the doctor's office and make a follow-up appointment, if you don't already have it.  Dressing or Band-Aid can be removed 1-2 days after surgery, depending on your comfort level. Once removed, replace the cotton ball in the ear as needed.  Once the dressing is off, and if you have an incision behind your ear with stitches, clean the incision twice daily with soap and water and apply Vaseline or antibiotic ointment after cleaning. If you have paper strips or surgical glue over the incision, Do not apply anything behind the ear.  Remove your arm dressing in 24-48 hours.  Bloody drainage from the ear is common.  Call the office if discharge from the ear last longer than 21 days or develops an odor or color.  Water should be kept out of the ear until it is healed. You may shower the day after surgery, if you keep your head dry.  The hair may be shampooed 2 days following surgery, providing water is not allowed into the ear canal.  A cotton ball covered with Vaseline should be used in the ear whenever you are around water.    Bloody discharge from incision area may occur during the first 10 days following surgery.  If this persists or increases, please call the office.  A full sensation with popping sounds may be noticed during the healing process.  DO NOT BLOW YOUR NOSE FOR THREE WEEKS FOLLOWING SURGERY. If you sneeze, do so with your mouth open for three weeks following surgery. Do not use a straw to drink beverages for 3 weeks following surgery.  Do not use Q-Tips or put anything in the canal, until approved by your doctor.  Do not be concerned regarding your hearing for a period of six to eight weeks following surgery.  Your hearing will be evaluated at this time; until then, your hearing may sound  muffled and your voice may echo in your ear during speech.  Minor swelling of the face on the same side of the surgery is not uncommon.  Small bruising near the eye or mouth is not uncommon from the facial nerve monitor.  Dizziness, ringing in the ear, taste disturbance, and after surgery are common. Call if severe.   You might notice pain when chewing, please use soft diet for 2 weeks if you experience this.  No lifting (more than 10 lbs) or straining until follow up.  You will be discharged on pain medications and usually antibiotics.  You may resume your routine medications as directed, unless you have been instructed otherwise by the prescribing healthcare provider.  Should you experience any difficulty upon returning home, or if you simply have questions, please contact us.  As your surgeons, we are most familiar with your operation and postoperative procedures.  We are accessible by telephone 24 hours a day, 7 days a week.  Once we have assessed your situation, we will be prepared to make specific suggestions for your care.       Call [phone] ([phone]) or [phone] (after-hours) any time day or night if you have any of the following:   Bad smell coming out of your ear   Severe swelling around your ear  Any questions  Pain in your ear   Redness around  your ear  Severe dizziness

## 2025-02-25 NOTE — ANESTHESIA POSTPROCEDURE EVALUATION
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      Conductive hearing loss, bilateral      Cleft palate and lip ([facility])      (Acquired stenosis of both external ear canals [H61.303])      (Conductive hearing loss, bilateral [H90.0])      (Cleft palate and lip ([facility]) [Q37.9])    Surgeons: Thierno Marrero MD Responsible Provider: Siomara Huffman DO    Anesthesia Type: general ASA Status: 3            Anesthesia Type: general    Vitals Value Taken Time   /57 02/25/25 1321   Temp 36.3 °C (97.3 °F) 02/25/25 1321   Pulse 94 02/25/25 1321   Resp 16 02/25/25 1321   SpO2 98 % 02/25/25 1321       Anesthesia Post Evaluation    Patient location during evaluation: PACU  Patient participation: complete - patient participated  Level of consciousness: awake  Pain score: 1  Pain management: adequate  Airway patency: patent  Cardiovascular status: acceptable  Respiratory status: acceptable  Hydration status: acceptable  Postoperative Nausea and Vomiting: none        There were no known notable events for this encounter.

## 2025-02-25 NOTE — ANESTHESIA PROCEDURE NOTES
Airway  Date/Time: 2/25/2025 7:46 AM  Urgency: elective    Airway not difficult    Staffing  Performed: PAM   Authorized by: Siomara Huffman DO    Performed by: PAM Bradshaw  Patient location during procedure: OR    Indications and Patient Condition  Indications for airway management: anesthesia  Spontaneous Ventilation: absent  Sedation level: deep  Preoxygenated: yes  Patient position: sniffing  Mask difficulty assessment: 1 - vent by mask    Final Airway Details  Final airway type: endotracheal airway      Successful airway: ETT     Successful intubation technique: direct laryngoscopy  Blade: Twan  Blade size: #3  ETT size (mm): 7.0  Cormack-Lehane Classification: grade I - full view of glottis  Placement verified by: capnometry   Number of attempts at approach: 1    Additional Comments  LTA kit used

## 2025-02-25 NOTE — ANESTHESIA PREPROCEDURE EVALUATION
Patient: Amanda Banks    Procedure Information       Date/Time: 02/25/25 7390    Procedure: Repair of ear canal atresia/stenosis, tympanoplasty, canalplasty, split thickness skin graft from arm, cartilage graft and possible ossicular chain reconstruction (Right) - OR: 4hrs    [facility]    Surgeons: Thierno Marrero MD            Relevant Problems   Anesthesia (within normal limits)      Cardiac   (+) Other supraventricular tachycardia      GI   (+) Gastroesophageal reflux disease without esophagitis      HEENT   (+) Conductive hearing loss, bilateral       Clinical information reviewed:   Tobacco  Allergies  Meds  Problems  Med Hx  Surg Hx   Fam Hx          NPO Detail:  No data recorded     Physical Exam    Airway  Mallampati: II  TM distance: <3 FB  Neck ROM: limited     Cardiovascular   Rhythm: regular  Rate: abnormal     Dental        Pulmonary - normal exam     Abdominal            Anesthesia Plan    History of general anesthesia?: yes  History of complications of general anesthesia?: no    ASA 3     general     The patient is not a current smoker.    intravenous induction   Anesthetic plan and risks discussed with patient.    Plan discussed with CAA.

## 2025-02-25 NOTE — OP NOTE
OPERATIVE NOTE     Date:  2025 [place] [facility] [place] [facility] [place]    Name: Amanda Banks : 1995, Age: 29 y.o., MRN: [id number], Sex: female      Surgeons      Thierno Marrero MD    Resident/Fellow/Other Assistant:  Jose Juan Frye MD, Joseline Salamanca MD    Anesthesia: General  ASA: III  Anesthesia Staff: Anesthesiologist: Siomara Huffman DO  C-AA: PAM Bradshaw  Staff: Circulator: Verito Feliciano RN; Libra Eller RN  Relief Circulator: Citlaly Dietz RN  Scrub Person: June Jura; aRdha Hernandez         Preoperative Diagnosis:  1. Acquired Ear Canal Stenosis.   - Bilateral  2. Conductive hearing loss   - Bilateral      Postoperative Diagnosis:  1. Acquired Ear Canal Stenosis.   - Bilateral  2. Conductive hearing loss   - Bilateral      Procedure Performed:  1. Reconstruction of the external auditory canal for stenosis/atresia. (76428)  - Right  2. Tympanoplasty, canalplasty without ossicular chain reconstruction  - Right  3. Application of skin graft  - Right   - Split-thickness   - Autograft Location: Ear Canal    - Size (sq. cm): 5.1cm x 3cm    - [place] Site: Right upper arm  4. Microsurgical techniques, requiring use of operating microscope. ([id number])   - Right  5. Needle electromyography; cranial nerve supplied muscle(s), unilateral. ([id number])   - Right   - Facial nerve      Indications:  Amanda Banks is a very pleasant 29 y.o. female who presents with bilateral acquired ear canal stenosis. She underwent repair of the left canal stenosis in . She became increasingly bothered by the conductive hearing loss on the right side due to the stenosis on this side.  We discussed the different options including reconstruction of her ear canal and tympanic membrane, versus JESSI.  The patient and family are interested in the surgical procedure.  The risks, indications, and complications of doing a reconstruction of the ear canal stenosis, tympanoplasty with canaloplasty, possible ossicular  chain reconstruction, skin graft, and possible cartilage grafts were discussed with the patient and family.  Those risks included, but were not limited to facial nerve injury, deafness in the operated ear, vertigo, dizziness, imbalance, facial weakness or paralysis, change in sense of taste, perforation of eardrum, pain, bleeding, infection, scarring, need for further surgery, recurrence, prosthesis extrusion, spinal fluid leak, meningitis, brain damage, restenosis, brain abscess, stroke, and death. Informed consent was obtained and the patient and family  elected to proceed. Because of the extensive nature of the dissection and the close proximity of the facial nerve, facial nerve monitoring was used throughout the case.       Operative Findings:  1. Acquired stenosis of the medial external auditory canal, with fibrosis of the soft tissue of the bony EAC starting at the BC junction extending to the lateral surface of the tympanic membrane. Additional irregular regions of bony ear canal overgrowth, notably anteriorly. This resulted in blunting and lateralization of the drum, which ended in a blind pouch.   2. Malleus, incus and stapes intact, footplate mobile. Chorda tympani nerve visualized and left undisturbed.   3. Retained PE tube in the middle ear space, removed.  4. Large canalplasty performed. Anterior canalplasty limited by TMJ, which was relatively posterior.  5. Inferior 2/3 of fibrous tympanic membrane preserved. Lateral to malleus underlay tympanoplasty technique used to cover inferior central perforation and pars flaccida region with temporalis fascia. Skin grafts placed to cover the walls of the external auditory canal.  6. 0.12 inch thickness skin graft from the right upper extremity was trimmed and placed over the walls of the external auditory canal.  7. Single incision meatoplasty performed. Ambrus ear wick placed to keep EAC patent.    Operative Technique:   The patient was identified in the  holding area and then brought to the operating room and placed supine on the operating table. After successful induction of general endotracheal anesthesia via endotracheal tube intubation, facial nerve bipolar electrodes were placed on the patient's orbicularis oris and oculi muscles and monitored the facial nerve throughout the course of the case. A small amount of hair was shaved and the patient had nearly 4 cc of 2% lidocaine with 1:100.000 epinephrine injected into their postauricular sulcus and tragus. The ear was prepped and draped in the normal fashion.     The arm was prepped and draped in normal sterile fashion. Using a skin dermatome, and split thickness skin graft measuring 5.1x 3cm and 0.12 inch in thickness was harvested from the right upper arm. Xeroform was placed over the harvested area followed by an Abd pad and tegaderm.     Standard 4 quadrant canal injections were performed. Vascular strip incisions were then made. The postauricular incision was opened down to the level of the loose areolar tissue over the temporalis fascia. A large graft of the loose areolar tissue material was harvested and pressed for later use in reconstructing the ear drum.     Using the operating micrsocope, a T-shaped mastoid periosteal incision was made. The mastoid periosteum was then raised superiorly over the temporalis, posterior over the sigmoid and anterior to the external auditory canal where the vascular strip was identified and reflected laterally.     The ear canal was stenotic in its medial aspect.  A circumferential incision was made along the anterior canal wall, staggered medially relative to the vascular strip circumferential incision. A thickened portion of fibrosis and soft tissue was removed from the anterosuperior aspect of the medial canal and discarded. The fibrotic plug was then elevated off the bony ear canal moving from lateral to medial to the level of the tympanic membrane. The skin of the  tympanic membrane was taken off the malleus lateral process and then dissected off the fibrous tympanic membrane. This plug was eventually removed and discarded. Using a combination of 3, 2, and 1.5 libia burrs a large canalplasty was performed until the entire annular rim was visualized, though the anterior portion of the canalplasty was limited by the posteriorly positioned TMJ. We ensured all skin remnants were removed from the bony canal. The malleus was palpated and noted to be mobile. A retained PE tube in the middle ear was visualized through a perforation in the central inferior fibrous drum and removed.    Attention was then turned to the reconstruction. A large meatoplasty was performed by extending the superior vascular strip radial incision more laterally towards the gissell. The soft tissue deep to the vascular strip was debulked. Hemostasis was acheived.     The Eustachian tube and middle ear were then packed with Otopore soaked in ciprodex. Two previously cut fascia grafts were then placed in an under-over fashion, tucked under the anterior edge of the inferior perforation and extended out laterally over the fibrous drum and posterior canal wall, and tucked under the anterior edge of the pars flaccida defect, with extension out laterally over the malleus lateral process and onto the posterosuperior canal wall. The skin grafts were placed circumferentially around the EAC as well as over the drum surface. Cigarette Gelfoam pieces secured the skin graft in place against the drum, ensuring an anterior sulcus was preserved.     The vascular strip was then returned to its normal location, and the ear canal was filled with ciprodex-soaked otopore medially. The periosteum was closed using interrupted Vicryl sutures. The skin was also closed using interrupted Vicryl sutures. The ear canal was inspected again to ensure the vascular strip was in a proper location. An Ambrus wick was placed in the lumen of the  EAC and expanded with ciprodex. The wound was then covered with a cotton ball, and a Yobani dressing.     This completed the procedure. The patient was then emerged from anesthesia and extubated without difficulty. The patient was then transported back to PACU. There were no apparent complications. Following the procedure, Dr. Cano discussed the findings with the patient's family and answered all of their questions.      Attending Attestation: I was present and scrubbed for the entire procedure.      Implants: Nothing was implanted during the procedure  Specimens: No specimens collected  Estimated Blood Loss:  10cc  Complications: none  Condition of the patient: Stable  Disposition: PACU    ____________________________________________________  Thierno Cano MD  Professor and Chief   Otology/Neurotology/Lateral Skull-Base Surgery   [facility]  Phone: [phone]  Fax: [phone]

## 2025-02-26 ENCOUNTER — TELEPHONE (OUTPATIENT)
Dept: OTOLARYNGOLOGY | Facility: HOSPITAL | Age: 30
End: 2025-02-26
Payer: COMMERCIAL

## 2025-02-26 NOTE — TELEPHONE ENCOUNTER
RN called patient as follow up from surgery with Dr. Cano. Patient states she does have pain. It is controlled with tramadol and tylenol alternating. All questions answered to patient's satisfaction.

## 2025-03-04 ENCOUNTER — TELEPHONE (OUTPATIENT)
Dept: OTOLARYNGOLOGY | Facility: HOSPITAL | Age: 30
End: 2025-03-04
Payer: COMMERCIAL

## 2025-03-04 NOTE — TELEPHONE ENCOUNTER
Patient send pictures of surgical ear drainage from overnight via email. Drainage does not saturate an entire square of gauze. Is serosangiunous and not copious. RN advised this is typical from surgery and if drainage becomes bloody or large quantity please inform RN. RN advised it is not permitted to use arm of glasses on surgical side until follow up with MD.

## 2025-03-05 ENCOUNTER — TELEPHONE (OUTPATIENT)
Dept: OTOLARYNGOLOGY | Facility: HOSPITAL | Age: 30
End: 2025-03-05
Payer: COMMERCIAL

## 2025-03-05 DIAGNOSIS — H81.399 PERIPHERAL VERTIGO, UNSPECIFIED LATERALITY: ICD-10-CM

## 2025-03-05 RX ORDER — PREDNISONE 10 MG/1
TABLET ORAL
Qty: 39 TABLET | Refills: 0 | Status: CANCELLED | OUTPATIENT
Start: 2025-03-05 | End: 2025-03-15

## 2025-03-05 NOTE — TELEPHONE ENCOUNTER
Patient sent below messages to RN. RN will reach out to MD regarding dizziness.    amari [email or web address]    Dizziness is Constant today and nothing is really helping   the nausea happened once last week, But today its really bad and I did eat something today   I am having regular movements, My man is making sure of that       On Wed, Mar 5, 2025 at 3:13?PM Melisa Laura <[email or web address] wrote:  Dizziness is expected after a surgery like the one you had.    Is the dizziness constant? Does it occur when you do certain things, such as looking up/down/left/right or when you stand up/lay down? Does anything make it worse or better?    Are you able to eat/drink with the nausea? Are you having regular bowel movements?      DARYL Muniz, RN        From: amari moore [email or web address]>  Sent: Wednesday, March 5, 2025 3:08 PM  To: Melisa Laura <[email or web address]>  Subject: Re: Draining     Is it normal to be dizzy and Still Nauseous

## 2025-03-10 NOTE — TELEPHONE ENCOUNTER
Please see below email communication from patient:      Melisa Laura  The yellow gauze can remain off of the skin graft. That's normal. You may put a thin layer of maine cream on the skin graft to help it heal with a very loose bandage over top so the cream doesn't get on your clothing.     Good I'm glad you do not have any more dizziness!      DARYL Muniz, RN    amari [email or web address]  Notice - This message is from an external sender  This message came from outside of . Careful opening links or attachments.  Report Suspicious     Yes the yellow Gauze that was on it, And yes its bleeding a little  No Im ok now, Not Dizzy anymore and not nauseated anymore  Visit us at [email or web address].    The enclosed information is STRICTLY CONFIDENTIAL and is intended for the  use of the addressee only. [facility] and its affiliates disclaim  any responsibility for unauthorized disclosure of this information to anyone  other than the addressee.        You  Mon 3/10/2025 11:49 AM  What protective layer are you talking about? The yellow gauze that was on it? The skin graft can be open to air without any covering. Is it bleeding? Also, are you still dizzy or nauseated? DARYL Muniz, RN Clinical Nurse Partner with Dr. Thierno moore<[email or web address]>      Melisa Laura    Notice - This message is from an external sender  This message came from outside of . Careful opening links or attachments.  Report Suspicious  Good Morning Melisa SÁNCHEZ,    The Gauze I put on my arm last night ripped the protective layer Dr Cano put on there, Is there something I can do to keep it from getting infected ?

## 2025-03-26 ENCOUNTER — APPOINTMENT (OUTPATIENT)
Dept: OTOLARYNGOLOGY | Facility: CLINIC | Age: 30
End: 2025-03-26
Payer: COMMERCIAL

## 2025-03-26 VITALS
SYSTOLIC BLOOD PRESSURE: 137 MMHG | WEIGHT: 212 LBS | HEART RATE: 69 BPM | BODY MASS INDEX: 34.22 KG/M2 | DIASTOLIC BLOOD PRESSURE: 89 MMHG

## 2025-03-26 DIAGNOSIS — Q37.9: ICD-10-CM

## 2025-03-26 DIAGNOSIS — H61.303 ACQUIRED STENOSIS OF BOTH EXTERNAL EAR CANALS: Primary | ICD-10-CM

## 2025-03-26 DIAGNOSIS — H90.0 CONDUCTIVE HEARING LOSS, BILATERAL: ICD-10-CM

## 2025-03-26 DIAGNOSIS — H72.92 TYMPANIC MEMBRANE PERFORATION, LEFT: ICD-10-CM

## 2025-03-26 PROCEDURE — 99024 POSTOP FOLLOW-UP VISIT: CPT | Performed by: OTOLARYNGOLOGY

## 2025-03-26 RX ORDER — TOBRAMYCIN AND DEXAMETHASONE 3; 1 MG/ML; MG/ML
SUSPENSION/ DROPS OPHTHALMIC
COMMUNITY
Start: 2024-12-17

## 2025-03-26 RX ORDER — METOPROLOL TARTRATE 100 MG/1
1 TABLET ORAL
COMMUNITY
Start: 2025-03-02

## 2025-03-26 RX ORDER — METOPROLOL TARTRATE 75 MG/1
75 TABLET ORAL NIGHTLY
COMMUNITY
Start: 2024-11-25

## 2025-03-26 NOTE — LETTER
March 30, 2025     Miguel Angel Mason, CARRIE-CNP  [address]  [facility] [address]    Patient: Amanda Banks   YOB: 1995   Date of Visit: 3/26/2025       Dear CARRIE Olivo-CNP:    Thank you for referring Amanda Banks to me for evaluation. Below are my notes for this consultation.  If you have questions, please do not hesitate to call me. I look forward to following your patient along with you.       Sincerely,     Thierno Marrero MD      CC: No Recipients  ______________________________________________________________________________________            Reason for Consult:  Post-op     Subjective  History Of Present Illness:  Amanda Banks is a 29 y.o. 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     She is s/p left-sided tympanoplasty and canaloplasty with reconstruction of acquired stenosis of the ear canal with skin graft from the left arm. The skin of the ear canal healed well fro the first 1.5 years. After an ear infection, the EAC started to mucolized and now has a small tympanic membrane perforation that we are watching.     On the right side, she continued to have conductive hearing loss. For that reason, I took her to the OR for a reconstruction of her EAC, tympanoplasty, and canalplasty without OCR on 02/25/2025.    During the procedure, I encountered:  1. Acquired stenosis of the medial external auditory canal, with fibrosis of the soft tissue of the bony EAC starting at the BC junction extending to the lateral surface of the tympanic membrane. Additional irregular regions of bony ear canal overgrowth, notably anteriorly. This resulted in blunting and lateralization of the drum, which ended in a blind pouch.   2. Malleus, incus and stapes intact, footplate mobile. Chorda tympani nerve visualized and left undisturbed.   3. Retained PE tube in the middle ear space,  removed.  4. Large canalplasty performed. Anterior canalplasty limited by TMJ, which was relatively posterior.  5. Inferior 2/3 of fibrous tympanic membrane preserved. Lateral to malleus underlay tympanoplasty technique used to cover inferior central perforation and pars flaccida region with temporalis fascia. Skin grafts placed to cover the walls of the external auditory canal.  6. 0.12 inch thickness skin graft from the right upper extremity was trimmed and placed over the walls of the external auditory canal.  7. Single incision meatoplasty performed. Ambrus ear wick placed to keep EAC patent.     Since surgery, she has been doing well. She is eager to have her packing out. No left ear drainage.       Past Medical History:  She has a past medical history of Adverse effect of anesthesia, Arrhythmia, blood clots, and Migraines.    Surgical History:  She has a past surgical history that includes Other surgical history; Tympanostomy tube placement; Lumbar fusion; Cosmetic surgery; Appendectomy; and Other surgical history.     Social History:  She reports that she has never smoked. She has never used smokeless tobacco. She reports that she does not currently use alcohol. She reports that she does not use drugs.    Family History:  family history is not on file.     Medications:  Current Outpatient Medications   Medication Instructions   • amitriptyline (ELAVIL) 10 mg   • ascorbic acid (VITAMIN C) 1,000 mg, Daily RT   • fluticasone (Flonase) 50 mcg/actuation nasal spray 2 sprays, Each Nostril, Daily, Shake gently. Before first use, prime pump. After use, clean tip and replace cap.   • GARLIC ORAL Take by mouth.   • metoprolol tartrate (Lopressor) 100 mg tablet 1 tablet, Every 12 hours scheduled (0630,1830)   • metoprolol tartrate (LOPRESSOR) 75 mg, Nightly   • multivitamin with iron (Child Multivitamin Plus Iron) chewable tablet Chew.   • naratriptan (AMERGE) 2.5 mg, Once as needed   • pantoprazole (PROTONIX) 40 mg   •  tobramycin-dexamethasone (Tobradex) ophthalmic suspension ADMINISTER 4 DROPS INTO THE LEFT EAR TWICE A DAY FOR 10 DAYS.   • zinc gluconate 30 mg tablet Take by mouth.      Allergies:  Latex, natural rubber; Sulfa (sulfonamide antibiotics); and Percocet [oxycodone-acetaminophen]    Review of Systems:   A comprehensive 10-point review of systems was obtained including constitutional, neurological, HEENT, pulmonary, cardiovascular, genito-urinary, and other pertinent systems and was negative except as noted in the HPI.     Objective  Physical Exam:  Last Recorded Vitals: Blood pressure 137/89, pulse 69, weight 96.2 kg (212 lb).    On physical exam, the patient is a well-nourished, well-developed patient, in no acute distress, able to communicate without assistance in English language. Head and face is atraumatic and normocephalic. Salivary glands are intact. Facial strength is symmetrical bilaterally.       On ear examination:  Right ear: The patient has packing which was removed. EAC and TM look in good condition. The skin grafts are taking.    BC>AC  Left ear: The patient has an open and patent ear canal. The neotympanum  is thickened with a pinpoint perforation in the anterior inferior quadrant .  AC>BC  The Gastelum is midline    On vestibular exam, the patient has no spontaneous nystagmus, no headshake nystagmus, no head-thrust nystagmus, and no nystagmus on hyperventilation or Valsalva maneuvers. Amherst-Hallpike maneuver is negative bilaterally.       On neuro exam, the patient is alert and oriented x3, cranial nerves are grossly intact, cerebellar exam is normal.      The rest of the exam, including anterior rhinoscopy, oropharyngeal exam, neck exam, and cardiovascular exam, were normal including no palpable lymphadenopathies, thyroid in the midline position, normal pulses, and normal chest excursion.       Reviewed Results:  Audiology Testing:  I personally reviewed the audiogram from 1/9/2025 that showed:  Right  ear: moderate conductive hearing loss with  25-35dB ABG. Discrimination 92%.   Left ear: mild conductive hearing loss in the low and high frequencies with normal hearing in the mid frequencies, max ABG 15-20dB ABG at 500 and 4000 Hz. Discrimination 96%.       I personally reviewed the audiogram from 10/2024 that showed:            Right ear: mild conductive hearing loss with a 20db of air bone gap. Discrimination: 100%            Left ear: normal hearing . Discrimination: 100%       I reviewed her audiogram from 08/2023 that showed a mild conductive hearing loss on the left side with closure of the air-borne gap within 10dB. On the right side, she has a moderate conductive hearing loss in the low frequencies upsloping to mild levels in the mid and high frequencies. She has a 25dB of air-borne gap in the low and mid frequencies on the right side. She has 100% discrimination bilaterally.       I reviewed her audiogram from 06/2022 that shows mild conductive hearing loss on the right side and a normal hearing on the left side with a 10 decibel air-bone gap. She has 100% discrimination on the right and 96% on the left. This is significant improvement.          I reviewed and interpreted the patient's audiogram 11/2021, which shows mild conductive hearing loss on the right side and moderate conductive hearing loss on the left side. The discrimination is 100% on the right and 96% on the left.      Imaging:  None     Procedure:  None    Assessment/Plan    1. 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 Conductive hearing loss, bilateral    3. Cleft palate and lip ([facility])    4. Tympanic membrane perforation, left          In summary, Amanda Banks is a 29 y.o. female with acquired stenosis and lateralization of both ear drums, worse on the left compared to the right, with bilateral conductive hearing loss, worse on the left than the right     She is s/p left-sided tympanoplasty and canaloplasty with reconstruction  of acquired stenosis of the ear canal with skin graft from the left arm. The skin of the ear canal  healed well for the first 1.5 years, but after an ear infection, there is a little bit of mucosalized drum anteriorly that currently dry and also a small tympanic membrane perforation.     As the right was her worse hearing ear, she elected to undergo surgery. We performed a tympanoplasty, canaloplasty, and repair of the acquired stenosis. The packing was removed and the skin graft and neotympanum are taking appropriately.    - Continue drops for 2-3 more weeks.  - Return to regular activities.  - Continue dry ear precautions.  - Follow up in 4 weeks.         Scribe Attestation  By signing my name below, I, Sherry Lawson , Scribe attest that this documentation has been prepared under the direction and in the presence of Thierno Marrero MD.    ____________________________________________________  Thierno Cano MD  Professor and Chief   Otology/Neurotology/Lateral Skull-Base Surgery   [facility]

## 2025-03-26 NOTE — PROGRESS NOTES
Reason for Consult:  Post-op     Subjective   History Of Present Illness:  Amanda Banks is a 29 y.o. 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     She is s/p left-sided tympanoplasty and canaloplasty with reconstruction of acquired stenosis of the ear canal with skin graft from the left arm. The skin of the ear canal healed well fro the first 1.5 years. After an ear infection, the EAC started to mucolized and now has a small tympanic membrane perforation that we are watching.     On the right side, she continued to have conductive hearing loss. For that reason, I took her to the OR for a reconstruction of her EAC, tympanoplasty, and canalplasty without OCR on 02/25/2025.    During the procedure, I encountered:  1. Acquired stenosis of the medial external auditory canal, with fibrosis of the soft tissue of the bony EAC starting at the BC junction extending to the lateral surface of the tympanic membrane. Additional irregular regions of bony ear canal overgrowth, notably anteriorly. This resulted in blunting and lateralization of the drum, which ended in a blind pouch.   2. Malleus, incus and stapes intact, footplate mobile. Chorda tympani nerve visualized and left undisturbed.   3. Retained PE tube in the middle ear space, removed.  4. Large canalplasty performed. Anterior canalplasty limited by TMJ, which was relatively posterior.  5. Inferior 2/3 of fibrous tympanic membrane preserved. Lateral to malleus underlay tympanoplasty technique used to cover inferior central perforation and pars flaccida region with temporalis fascia. Skin grafts placed to cover the walls of the external auditory canal.  6. 0.12 inch thickness skin graft from the right upper extremity was trimmed and placed over the walls of the external auditory canal.  7. Single incision meatoplasty performed. Ambrus ear wick placed to keep EAC patent.     Since surgery, she has  been doing well. She is eager to have her packing out. No left ear drainage.       Past Medical History:  She has a past medical history of Adverse effect of anesthesia, Arrhythmia, blood clots, and Migraines.    Surgical History:  She has a past surgical history that includes Other surgical history; Tympanostomy tube placement; Lumbar fusion; Cosmetic surgery; Appendectomy; and Other surgical history.     Social History:  She reports that she has never smoked. She has never used smokeless tobacco. She reports that she does not currently use alcohol. She reports that she does not use drugs.    Family History:  family history is not on file.     Medications:  Current Outpatient Medications   Medication Instructions    amitriptyline (ELAVIL) 10 mg    ascorbic acid (VITAMIN C) 1,000 mg, Daily RT    fluticasone (Flonase) 50 mcg/actuation nasal spray 2 sprays, Each Nostril, Daily, Shake gently. Before first use, prime pump. After use, clean tip and replace cap.    GARLIC ORAL Take by mouth.    metoprolol tartrate (Lopressor) 100 mg tablet 1 tablet, Every 12 hours scheduled (0630,1830)    metoprolol tartrate (LOPRESSOR) 75 mg, Nightly    multivitamin with iron (Child Multivitamin Plus Iron) chewable tablet Chew.    naratriptan (AMERGE) 2.5 mg, Once as needed    pantoprazole (PROTONIX) 40 mg    tobramycin-dexamethasone (Tobradex) ophthalmic suspension ADMINISTER 4 DROPS INTO THE LEFT EAR TWICE A DAY FOR 10 DAYS.    zinc gluconate 30 mg tablet Take by mouth.      Allergies:  Latex, natural rubber; Sulfa (sulfonamide antibiotics); and Percocet [oxycodone-acetaminophen]    Review of Systems:   A comprehensive 10-point review of systems was obtained including constitutional, neurological, HEENT, pulmonary, cardiovascular, genito-urinary, and other pertinent systems and was negative except as noted in the HPI.     Objective   Physical Exam:  Last Recorded Vitals: Blood pressure 137/89, pulse 69, weight 96.2 kg (212 lb).    On  physical exam, the patient is a well-nourished, well-developed patient, in no acute distress, able to communicate without assistance in English language. Head and face is atraumatic and normocephalic. Salivary glands are intact. Facial strength is symmetrical bilaterally.       On ear examination:  Right ear: The patient has packing which was removed. EAC and TM look in good condition. The skin grafts are taking.    BC>AC  Left ear: The patient has an open and patent ear canal. The neotympanum  is thickened with a pinpoint perforation in the anterior inferior quadrant .  AC>BC  The Gastelum is midline    On vestibular exam, the patient has no spontaneous nystagmus, no headshake nystagmus, no head-thrust nystagmus, and no nystagmus on hyperventilation or Valsalva maneuvers. [place]-Hallpike maneuver is negative bilaterally.       On neuro exam, the patient is alert and oriented x3, cranial nerves are grossly intact, cerebellar exam is normal.      The rest of the exam, including anterior rhinoscopy, oropharyngeal exam, neck exam, and cardiovascular exam, were normal including no palpable lymphadenopathies, thyroid in the midline position, normal pulses, and normal chest excursion.       Reviewed Results:  Audiology Testing:  I personally reviewed the audiogram from 1/9/2025 that showed:  Right ear: moderate conductive hearing loss with  25-35dB ABG. Discrimination 92%.   Left ear: mild conductive hearing loss in the low and high frequencies with normal hearing in the mid frequencies, max ABG 15-20dB ABG at 500 and 4000 Hz. Discrimination 96%.       I personally reviewed the audiogram from 10/2024 that showed:            Right ear: mild conductive hearing loss with a 20db of air bone gap. Discrimination: 100%            Left ear: normal hearing . Discrimination: 100%       I reviewed her audiogram from 08/2023 that showed a mild conductive hearing loss on the left side with closure of the air-borne gap within 10dB. On the  right side, she has a moderate conductive hearing loss in the low frequencies upsloping to mild levels in the mid and high frequencies. She has a 25dB of air-borne gap in the low and mid frequencies on the right side. She has 100% discrimination bilaterally.       I reviewed her audiogram from 06/2022 that shows mild conductive hearing loss on the right side and a normal hearing on the left side with a 10 decibel air-bone gap. She has 100% discrimination on the right and 96% on the left. This is significant improvement.          I reviewed and interpreted the patient's audiogram 11/2021, which shows mild conductive hearing loss on the right side and moderate conductive hearing loss on the left side. The discrimination is 100% on the right and 96% on the left.      Imaging:  None     Procedure:  None    Assessment/Plan     1. 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 Conductive hearing loss, bilateral    3. Cleft palate and lip ([facility])    4. Tympanic membrane perforation, left          In summary, Amanda Banks is a 29 y.o. 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 The skin of the ear canal  healed well for the first 1.5 years, but after an ear infection, there is a little bit of mucosalized drum anteriorly that currently dry and also a small tympanic membrane perforation.     As the right was her worse hearing ear, she elected to undergo surgery. We performed a tympanoplasty, canaloplasty, and repair of the acquired stenosis. The packing was removed and the skin graft and neotympanum are taking appropriately.    - Continue drops for 2-3 more weeks.  - Return to regular activities.  - Continue dry ear precautions.  -  Follow up in 4 weeks.         Scribe Attestation  By signing my name below, I, Sherry Lulu , Cristhian attest that this documentation has been prepared under the direction and in the presence of Thierno Marrero MD.    ____________________________________________________  Thierno Cano MD  Professor and Chief   Otology/Neurotology/Lateral Skull-Base Surgery   [facility]

## 2025-04-11 ENCOUNTER — OFFICE VISIT (OUTPATIENT)
Dept: OTOLARYNGOLOGY | Facility: CLINIC | Age: 30
End: 2025-04-11
Payer: COMMERCIAL

## 2025-04-11 DIAGNOSIS — H69.93 DYSFUNCTION OF BOTH EUSTACHIAN TUBES: ICD-10-CM

## 2025-04-11 DIAGNOSIS — H72.92 TYMPANIC MEMBRANE PERFORATION, LEFT: ICD-10-CM

## 2025-04-11 DIAGNOSIS — H90.0 CONDUCTIVE HEARING LOSS, BILATERAL: ICD-10-CM

## 2025-04-11 DIAGNOSIS — H61.303 ACQUIRED STENOSIS OF BOTH EXTERNAL EAR CANALS: Primary | ICD-10-CM

## 2025-04-11 DIAGNOSIS — Q37.9: ICD-10-CM

## 2025-04-11 DIAGNOSIS — G43.809 VESTIBULAR MIGRAINE: ICD-10-CM

## 2025-04-11 PROCEDURE — 99024 POSTOP FOLLOW-UP VISIT: CPT | Performed by: OTOLARYNGOLOGY

## 2025-04-11 NOTE — PROGRESS NOTES
RN faxed order for mastoid powder to [facility] for 1 puff to affected ear EOD

## 2025-04-11 NOTE — PROGRESS NOTES
Reason for Consult:  Follow-up Left Ear Pain      Subjective   History Of Present Illness:  Amanda Banks is a 29 y.o. 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     She is s/p left-sided tympanoplasty and canaloplasty with reconstruction of acquired stenosis of the ear canal with skin graft from the left arm. The skin of the ear canal healed well fro the first 1.5 years. After an ear infection, the EAC started to mucolized and now has a small tympanic membrane perforation that we are watching.     On the right side, she continued to have conductive hearing loss. For that reason, I took her to the OR for a reconstruction of her EAC, tympanoplasty, and canalplasty without OCR on 02/25/2025.     During the procedure, I encountered:  1. Acquired stenosis of the medial external auditory canal, with fibrosis of the soft tissue of the bony EAC starting at the BC junction extending to the lateral surface of the tympanic membrane. Additional irregular regions of bony ear canal overgrowth, notably anteriorly. This resulted in blunting and lateralization of the drum, which ended in a blind pouch.   2. Malleus, incus and stapes intact, footplate mobile. Chorda tympani nerve visualized and left undisturbed.   3. Retained PE tube in the middle ear space, removed.  4. Large canalplasty performed. Anterior canalplasty limited by TMJ, which was relatively posterior.  5. Inferior 2/3 of fibrous tympanic membrane preserved. Lateral to malleus underlay tympanoplasty technique used to cover inferior central perforation and pars flaccida region with temporalis fascia. Skin grafts placed to cover the walls of the external auditory canal.  6. 0.12 inch thickness skin graft from the right upper extremity was trimmed and placed over the walls of the external auditory canal.  7. Single incision meatoplasty performed. Ambrus ear wick placed to keep EAC patent.     Since  surgery, she has been doing well. She complains of some drainage from the left ear. She has some dizziness associated with headaches       Past Medical History:  She has a past medical history of Adverse effect of anesthesia, Arrhythmia, blood clots, and Migraines.    Surgical History:  She has a past surgical history that includes Other surgical history; Tympanostomy tube placement; Lumbar fusion; Cosmetic surgery; Appendectomy; and Other surgical history.     Social History:  She reports that she has never smoked. She has never used smokeless tobacco. She reports that she does not currently use alcohol. She reports that she does not use drugs.    Family History:  family history is not on file.     Medications:  Current Outpatient Medications   Medication Instructions    amitriptyline (ELAVIL) 10 mg    ascorbic acid (VITAMIN C) 1,000 mg, Daily RT    fluticasone (Flonase) 50 mcg/actuation nasal spray 2 sprays, Each Nostril, Daily, Shake gently. Before first use, prime pump. After use, clean tip and replace cap.    GARLIC ORAL Take by mouth.    metoprolol tartrate (Lopressor) 100 mg tablet 1 tablet, Every 12 hours scheduled (0630,1830)    metoprolol tartrate (LOPRESSOR) 75 mg, Nightly    multivitamin with iron (Child Multivitamin Plus Iron) chewable tablet Chew.    naratriptan (AMERGE) 2.5 mg, Once as needed    pantoprazole (PROTONIX) 40 mg    tobramycin-dexamethasone (Tobradex) ophthalmic suspension ADMINISTER 4 DROPS INTO THE LEFT EAR TWICE A DAY FOR 10 DAYS.    zinc gluconate 30 mg tablet Take by mouth.      Allergies:  Latex, natural rubber; Sulfa (sulfonamide antibiotics); and Percocet [oxycodone-acetaminophen]    Review of Systems:   A comprehensive 10-point review of systems was obtained including constitutional, neurological, HEENT, pulmonary, cardiovascular, genito-urinary, and other pertinent systems and was negative except as noted in the HPI.     Objective   Physical Exam:  Last Recorded Vitals: There  were no vitals taken for this visit.  On physical exam, the patient is a well-nourished, well-developed patient, in no acute distress, able to communicate without assistance in English language. Head and face is atraumatic and normocephalic. Salivary glands are intact. Facial strength is symmetrical bilaterally.        On ear examination:  Right ear: The patient has packing which was removed. EAC and TM look in good condition. The skin grafts are taking.    AC>BC  Left ear: The patient has an open and patent ear canal. The neotympanum has some blunting, is thickened with a pinpoint perforation in the anterior inferior quadrant with mucoid effusion  .  AC>BC  The Gastelum is left     On vestibular exam, the patient has no spontaneous nystagmus, no headshake nystagmus, no head-thrust nystagmus, and no nystagmus on hyperventilation or Valsalva maneuvers. [place]-Hallpike maneuver is negative bilaterally.        On neuro exam, the patient is alert and oriented x3, cranial nerves are grossly intact, cerebellar exam is normal.       The rest of the exam, including anterior rhinoscopy, oropharyngeal exam, neck exam, and cardiovascular exam, were normal including no palpable lymphadenopathies, thyroid in the midline position, normal pulses, and normal chest excursion.       Reviewed Results:  Audiology Testing:     I personally reviewed the audiogram from 1/9/2025 that showed:  Right ear: moderate conductive hearing loss with  25-35dB ABG. Discrimination 92%.   Left ear: mild conductive hearing loss in the low and high frequencies with normal hearing in the mid frequencies, max ABG 15-20dB ABG at 500 and 4000 Hz. Discrimination 96%.       I personally reviewed the audiogram from 10/2024 that showed:            Right ear: mild conductive hearing loss with a 20db of air bone gap. Discrimination: 100%            Left ear: normal hearing . Discrimination: 100%       I reviewed her audiogram from 08/2023 that showed a mild conductive  hearing loss on the left side with closure of the air-borne gap within 10dB. On the right side, she has a moderate conductive hearing loss in the low frequencies upsloping to mild levels in the mid and high frequencies. She has a 25dB of air-borne gap in the low and mid frequencies on the right side. She has 100% discrimination bilaterally.       I reviewed her audiogram from 06/2022 that shows mild conductive hearing loss on the right side and a normal hearing on the left side with a 10 decibel air-bone gap. She has 100% discrimination on the right and 96% on the left. This is significant improvement.          I reviewed and interpreted the patient's audiogram 11/2021, which shows mild conductive hearing loss on the right side and moderate conductive hearing loss on the left side. The discrimination is 100% on the right and 96% on the left.       Procedure:  None    Assessment/Plan     1. 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 Conductive hearing loss, bilateral    3. Cleft palate and lip ([facility])    4. Tympanic membrane perforation, left    5. Dysfunction of both eustachian tubes    6. Vestibular migraine        In summary, Amanda Banks is a 29 y.o. 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 The skin of the ear canal  healed well for the first 1.5 years, but after an ear infection, there is a little bit of mucosalized drum anteriorly that currently dry and also a small tympanic membrane perforation.     As the right was her worse hearing ear, she elected to undergo surgery. We performed a tympanoplasty, canaloplasty, and repair of the acquired stenosis. The skin grafts on the right are taking. Today I applied  gentian violet. She will use mastoid powder every other day.     She is currently having vestibular migraines and seeing neurology. Her current dosage of amitriptyline is very low . Follow-up with neurology as she should be at 25-50 mg. Follow-up in 1 month         ____________________________________________________  Thierno Cano MD  Professor and Chief   Otology/Neurotology/Lateral Skull-Base Surgery   [facility]    Scribe Attestation  By signing my name below, I, Tracy Lois, Scribe   attest that this documentation has been prepared under the direction and in the presence of Thierno Marrero MD.

## 2025-04-11 NOTE — PATIENT INSTRUCTIONS
MIGRAINE / HYPERSENSITIVITY DIET        BREAD  Acceptable purchases: Any white, wheat, rye or pumpernickel store-bought bread. Plain or sesame seed bagels, English muffins, quick breads like pumpernickel or zucchini breads. All yeast bread must be 24 hours old.     What to avoid: Fresh baked bread, either homemade or from the grocer's bakery, fresh donuts, fresh breakfast Macedonian, nut breads, cheese bread, chocolate bread, raisin bread, bagels with dried fruit like blueberry or cranberry bagels. Remember that pizza is fresh bread.     CEREAL   Acceptable purchases: Many cereals are fine. For example: Cheerios, Life, Honey Bunches of Oats, Cracklin' Bran, Frosted Flakes, Frosted Shredded Wheat.     What to avoid: Cereal with nuts, raisins, chocolate, dried fruit, aspartame, peanut butter or coconut.     CRACKERS  Acceptable purchases: Any unflavored cracker such as Saltines, Ritz, Wheat thins, Ramirez's Table Crackers and Club crackers.     What to avoid: Cheddar cheese crackers, Chick-in-a-bisket, any flavored cracker.     PRETZELS/CHIPS   Acceptable purchases: All plain pretzels and plain potato chips, Tostitos 100% corn chips, Frito's corn chips, Saul's salt and vinegar chips.     What to avoid: Soft pretzels, honey and mustard pretzels, onion and garlic pretzels or other seasoned pretzels. Avoid Pringles, Doritos Feroz chips, jalapeno chips and most other seasoned chips.    PIES/CAKES/COOKIES/CANDY   Acceptable purchases: Blueberry and apple store bought pies if made without lemon juice, vanilla or cinnamon swirl cake, shortbread cookies and vanilla/strawberry wafers, oatmeal cookies without the raisins, rice pudding (no raisins), white chocolate.    What to avoid: Chocolate, chocolate candy, nuts, buttermilk, sour cream, dried fruit (some apricot pies start with dried apricots), peanut butter, lemon extract or lemon juice, almond extract and coconut. Avoid diet and sugar-free products that contain aspartame.      SALAD DRESSING   Acceptable purchases: Any oil and distilled white vinegar. (Homemade ranch is good but you won't find that in the grocery store).     What to avoid: most bottled dressings have one or many of the following; monosodium glutamate, onion or onion powder, grated cheese like [place] or parmesan, natural flavoring, red wine vinegar or balsamic vinegar (or anything other than white).     DIPS/SAUCES   Acceptable purchases: buy ingredients to make your own at home.     What to avoid: dips and sauces usually contain MSG (natural flavoring) or onions. Avoid salsa, chips dips, tomato sauce like Ragu, jose or pesto sauce, gravy, mustard dips, barbeque sauce and guacamole (because of the avocados).     MEAT AND MAIN MEALS   Acceptable purchases: Fresh chicken, beef, veal, lamb, fish, turkey or pork. (Some sausage is made without MSG, natural flavor or onion). Be sure the meat is not injected with a tenderizer (like [employer]'s Simple Tender pork products) or with broth (some turkey and chicken).    What to avoid: Beef liver and chicken liver, marinated meat, ready-made hot wings, barbeque chicken, breaded meat like fried chicken or nuggets or breaded chicken patties, seasoned rotisserie chicken, and any ready-made meal of meat, noodle or rice like burritos, lasagna, Rice-a-Jose Elias and Hamburger Whitehouse. Any canned tuna with broth. Anchovies. Spam. Canned soups have MSG and sometimes onions. Avoid nitrites in ham, hot dogs and most lunchmeats.     DAIRY PRODUCTS   Acceptable purchases: Deli American cheese, American cheese with jalapeno peppers, cottage cheese, ricotta cheese and cream cheese. White milk is ok.    What to avoid: Aged cheeses like Cheddar, Keith Lawson, Niko and Swiss. Avoid mozzarella cheese, Brie, sour cream buttermilk and yogurt. Beware of products made with cheese like pizza and hot pockets. Avoid chocolate milk due to the caffeine.    FRUITS/JUICES   Acceptable purchases: Fresh  strawberries, apples, pears, grapes, peaches, nectarines, blueberries, kiwi, apricots, blackberries, cherries, cantaloupes, mangoes, honeydew melon and watermelon.     What to avoid: Bananas, oranges, grapefruit, kade, limes, tangerines, pineapples, Clementines, raspberries, plums, papayas, passion fruit, figs, dates, raisins and avocados. Also avoid dried fruits preserved with sulfites.     VEGETABLES   Acceptable purchases: Preservative-free bagged lettuce like Fresh Express, peppers, zucchini, eggplant, garlic, leeks, spring onions, shallots, potatoes (fresh), some frozen mashed potatoes, broccoli, asparagus, cauliflower, [place]' sprouts, carrots, corn, chick peas, mushrooms, canned or frozen peas, yams, string beans, artichokes, red beets, some beans, okra, plain rice, turnips and squash.     What to avoid: Onions, sauerkraut, pea pods, broad Italian beans, lima beans, khang beans, navy beans and lentils. Also avoid boxed potato flakes, like instant mashed potatoes.     DRINKS   Acceptable purchases: Naturally decaffeinated coffee or tea, caffeine-free herb tea like chamomile, pear juice, apple juice, grape juice, cranberry juice, apricot nectar, caffeine-free Coke/Pepsi, Diet Rite Cola, Waist Watcher Cola/Diet Rootbeer/Diet Black Cherry, Mug Rootbeer, Hires Rootbeer and A&W Rootbeer. Diet soda using sucralose (Splenda) is not a problem. Vodka is the best tolerated alcoholic beverage. White milk is ok.     What to avoid: Coffee, tea, coffee substitutes, hot chocolate, yang, orange soda, lemon lime soda, mountain Dew, any diet soda containing aspartame or saccharin, Barq's Rootbeer, (they add caffeine to it), chocolate milk, wine, champagne, beer, heavy alcoholic drinks.     NUTS/SEEDS/POPCORN   Acceptable purchases: Unflavored popcorn that you pop at home, pumpkins seeds, sunflower seeds without natural flavor, sesame seeds and poppy seeds.    What to avoid: Cheddar cheese popcorn, some microwave popcorn,  all nuts and nut butters, including peanuts. Coconut is out as well as almond extract.     SOY PRODUCTS:   Acceptable purchases: Any soy is questionable, so you might want to avoid it altogether until you have achieved headache control. Then try the following products one at a time: soy milk, soy flour, plain tofu and soy oil.     What to avoid: Soy sauce, miso, tempeh, soy burgers, products containing soy protein isolate or concentrate and soy beans.         RECOMMENDED BOOK

## 2025-04-11 NOTE — LETTER
April 12, 2025     Miguel Angel Mason, CARRIE-CNP  [address]  [facility] [id number]    Patient: Amanda Banks   YOB: 1995   Date of Visit: 4/11/2025       Dear CARRIE Olivo-CNP:    Thank you for referring Amanda Banks to me for evaluation. Below are my notes for this consultation.  If you have questions, please do not hesitate to call me. I look forward to following your patient along with you.       Sincerely,     Thierno Marrero MD      CC: No Recipients  ______________________________________________________________________________________            Reason for Consult:  Follow-up Left Ear Pain      Subjective  History Of Present Illness:  Amanda Banks is a 29 y.o. 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     She is s/p left-sided tympanoplasty and canaloplasty with reconstruction of acquired stenosis of the ear canal with skin graft from the left arm. The skin of the ear canal healed well fro the first 1.5 years. After an ear infection, the EAC started to mucolized and now has a small tympanic membrane perforation that we are watching.     On the right side, she continued to have conductive hearing loss. For that reason, I took her to the OR for a reconstruction of her EAC, tympanoplasty, and canalplasty without OCR on 02/25/2025.     During the procedure, I encountered:  1. Acquired stenosis of the medial external auditory canal, with fibrosis of the soft tissue of the bony EAC starting at the BC junction extending to the lateral surface of the tympanic membrane. Additional irregular regions of bony ear canal overgrowth, notably anteriorly. This resulted in blunting and lateralization of the drum, which ended in a blind pouch.   2. Malleus, incus and stapes intact, footplate mobile. Chorda tympani nerve visualized and left undisturbed.   3. Retained PE tube in the middle ear  space, removed.  4. Large canalplasty performed. Anterior canalplasty limited by TMJ, which was relatively posterior.  5. Inferior 2/3 of fibrous tympanic membrane preserved. Lateral to malleus underlay tympanoplasty technique used to cover inferior central perforation and pars flaccida region with temporalis fascia. Skin grafts placed to cover the walls of the external auditory canal.  6. 0.12 inch thickness skin graft from the right upper extremity was trimmed and placed over the walls of the external auditory canal.  7. Single incision meatoplasty performed. Ambrus ear wick placed to keep EAC patent.     Since surgery, she has been doing well. She complains of some drainage from the left ear. She has some dizziness associated with headaches       Past Medical History:  She has a past medical history of Adverse effect of anesthesia, Arrhythmia, blood clots, and Migraines.    Surgical History:  She has a past surgical history that includes Other surgical history; Tympanostomy tube placement; Lumbar fusion; Cosmetic surgery; Appendectomy; and Other surgical history.     Social History:  She reports that she has never smoked. She has never used smokeless tobacco. She reports that she does not currently use alcohol. She reports that she does not use drugs.    Family History:  family history is not on file.     Medications:  Current Outpatient Medications   Medication Instructions   • amitriptyline (ELAVIL) 10 mg   • ascorbic acid (VITAMIN C) 1,000 mg, Daily RT   • fluticasone (Flonase) 50 mcg/actuation nasal spray 2 sprays, Each Nostril, Daily, Shake gently. Before first use, prime pump. After use, clean tip and replace cap.   • GARLIC ORAL Take by mouth.   • metoprolol tartrate (Lopressor) 100 mg tablet 1 tablet, Every 12 hours scheduled (0630,1830)   • metoprolol tartrate (LOPRESSOR) 75 mg, Nightly   • multivitamin with iron (Child Multivitamin Plus Iron) chewable tablet Chew.   • naratriptan (AMERGE) 2.5 mg, Once  as needed   • pantoprazole (PROTONIX) 40 mg   • tobramycin-dexamethasone (Tobradex) ophthalmic suspension ADMINISTER 4 DROPS INTO THE LEFT EAR TWICE A DAY FOR 10 DAYS.   • zinc gluconate 30 mg tablet Take by mouth.      Allergies:  Latex, natural rubber; Sulfa (sulfonamide antibiotics); and Percocet [oxycodone-acetaminophen]    Review of Systems:   A comprehensive 10-point review of systems was obtained including constitutional, neurological, HEENT, pulmonary, cardiovascular, genito-urinary, and other pertinent systems and was negative except as noted in the HPI.     Objective  Physical Exam:  Last Recorded Vitals: There were no vitals taken for this visit.  On physical exam, the patient is a well-nourished, well-developed patient, in no acute distress, able to communicate without assistance in English language. Head and face is atraumatic and normocephalic. Salivary glands are intact. Facial strength is symmetrical bilaterally.        On ear examination:  Right ear: The patient has packing which was removed. EAC and TM look in good condition. The skin grafts are taking.    AC>BC  Left ear: The patient has an open and patent ear canal. The neotympanum has some blunting, is thickened with a pinpoint perforation in the anterior inferior quadrant with mucoid effusion  .  AC>BC  The Gastelum is left     On vestibular exam, the patient has no spontaneous nystagmus, no headshake nystagmus, no head-thrust nystagmus, and no nystagmus on hyperventilation or Valsalva maneuvers. [place]ola-Hallpike maneuver is negative bilaterally.        On neuro exam, the patient is alert and oriented x3, cranial nerves are grossly intact, cerebellar exam is normal.       The rest of the exam, including anterior rhinoscopy, oropharyngeal exam, neck exam, and cardiovascular exam, were normal including no palpable lymphadenopathies, thyroid in the midline position, normal pulses, and normal chest excursion.       Reviewed Results:  Audiology Testing:      I personally reviewed the audiogram from 1/9/2025 that showed:  Right ear: moderate conductive hearing loss with  25-35dB ABG. Discrimination 92%.   Left ear: mild conductive hearing loss in the low and high frequencies with normal hearing in the mid frequencies, max ABG 15-20dB ABG at 500 and 4000 Hz. Discrimination 96%.       I personally reviewed the audiogram from 10/2024 that showed:            Right ear: mild conductive hearing loss with a 20db of air bone gap. Discrimination: 100%            Left ear: normal hearing . Discrimination: 100%       I reviewed her audiogram from 08/2023 that showed a mild conductive hearing loss on the left side with closure of the air-borne gap within 10dB. On the right side, she has a moderate conductive hearing loss in the low frequencies upsloping to mild levels in the mid and high frequencies. She has a 25dB of air-borne gap in the low and mid frequencies on the right side. She has 100% discrimination bilaterally.       I reviewed her audiogram from 06/2022 that shows mild conductive hearing loss on the right side and a normal hearing on the left side with a 10 decibel air-bone gap. She has 100% discrimination on the right and 96% on the left. This is significant improvement.          I reviewed and interpreted the patient's audiogram 11/2021, which shows mild conductive hearing loss on the right side and moderate conductive hearing loss on the left side. The discrimination is 100% on the right and 96% on the left.       Procedure:  None    Assessment/Plan    1. 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 Conductive hearing loss, bilateral    3. Cleft palate and lip ([facility]HS-HCC)    4. Tympanic membrane perforation, left    5. Dysfunction of both eustachian tubes        In summary, Amanda Banks is a 29 y.o. 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 The skin of the ear canal  healed well for the first 1.5 years, but after an ear infection, there is a little bit of mucosalized drum anteriorly that currently dry and also a small tympanic membrane perforation.     As the right was her worse hearing ear, she elected to undergo surgery. We performed a tympanoplasty, canaloplasty, and repair of the acquired stenosis. The skin grafts on the right are taking. Today I applied gentian violet. She will use mastoid powder every other day.     She is currently having vestibular migraines and seeing neurology. Her current dosage of amitriptyline is very low . Follow-up with neurology as she should be at 25-50 mg. Follow-up in 1 month         ____________________________________________________  Thierno Cano MD  Professor and Chief   Otology/Neurotology/Lateral Skull-Base Surgery   [facility]    Scribe Attestation  By signing my name below, I, Tracy Abreu, Scribe   attest that this documentation has been prepared under the direction and in the presence of Thierno Marrero MD.

## 2025-04-12 PROBLEM — G43.809 VESTIBULAR MIGRAINE: Status: ACTIVE | Noted: 2025-04-12

## 2025-05-08 NOTE — PROGRESS NOTES
Reason for Consult:  Follow-up     Subjective   History Of Present Illness:  Amanda Banks is a 29 y.o. 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     She is s/p left-sided tympanoplasty and canaloplasty with reconstruction of acquired stenosis of the ear canal with skin graft from the left arm. The skin of the ear canal healed well fro the first 1.5 years. After an ear infection, the EAC started to mucolized and now has a small tympanic membrane perforation that we are watching.     On the right side, she continued to have conductive hearing loss. For that reason, I took her to the OR for a reconstruction of her EAC, tympanoplasty, and canalplasty without OCR on 02/25/2025.     During the procedure, I encountered:  1. Acquired stenosis of the medial external auditory canal, with fibrosis of the soft tissue of the bony EAC starting at the BC junction extending to the lateral surface of the tympanic membrane. Additional irregular regions of bony ear canal overgrowth, notably anteriorly. This resulted in blunting and lateralization of the drum, which ended in a blind pouch.   2. Malleus, incus and stapes intact, footplate mobile. Chorda tympani nerve visualized and left undisturbed.   3. Retained PE tube in the middle ear space, removed.  4. Large canalplasty performed. Anterior canalplasty limited by TMJ, which was relatively posterior.  5. Inferior 2/3 of fibrous tympanic membrane preserved. Lateral to malleus underlay tympanoplasty technique used to cover inferior central perforation and pars flaccida region with temporalis fascia. Skin grafts placed to cover the walls of the external auditory canal.  6. 0.12 inch thickness skin graft from the right upper extremity was trimmed and placed over the walls of the external auditory canal.  7. Single incision meatoplasty performed. Ambrus ear wick placed to keep EAC patent.     Since surgery, she  has been doing well. Her hearing has improved on the right. Her left ear remains relatively dry.      Past Medical History:  She has a past medical history of Adverse effect of anesthesia, Arrhythmia, blood clots, and Migraines.    Surgical History:  She has a past surgical history that includes Other surgical history; Tympanostomy tube placement; Lumbar fusion; Cosmetic surgery; Appendectomy; and Other surgical history.     Social History:  She reports that she has never smoked. She has never been exposed to tobacco smoke. She has never used smokeless tobacco. She reports that she does not currently use alcohol. She reports that she does not use drugs.    Family History:  family history is not on file.     Medications:  Current Outpatient Medications   Medication Instructions    amitriptyline (ELAVIL) 10 mg    ascorbic acid (VITAMIN C) 1,000 mg, Daily RT    fluticasone (Flonase) 50 mcg/actuation nasal spray 2 sprays, Each Nostril, Daily, Shake gently. Before first use, prime pump. After use, clean tip and replace cap.    GARLIC ORAL Take by mouth.    metoprolol tartrate (Lopressor) 100 mg tablet 1 tablet, Every 12 hours scheduled (0630,1830)    metoprolol tartrate (LOPRESSOR) 75 mg, Nightly    multivitamin with iron (Child Multivitamin Plus Iron) chewable tablet Chew.    naratriptan (AMERGE) 2.5 mg, Once as needed    pantoprazole (PROTONIX) 40 mg    tobramycin-dexamethasone (Tobradex) ophthalmic suspension ADMINISTER 4 DROPS INTO THE LEFT EAR TWICE A DAY FOR 10 DAYS.    zinc gluconate 30 mg tablet Take by mouth.      Allergies:  Latex, natural rubber; Sulfa (sulfonamide antibiotics); and Percocet [oxycodone-acetaminophen]    Review of Systems:   A comprehensive 10-point review of systems was obtained including constitutional, neurological, HEENT, pulmonary, cardiovascular, genito-urinary, and other pertinent systems and was negative except as noted in the HPI.     Objective   Physical Exam:  Last Recorded Vitals:  "Height 1.676 m (5' 6\"), weight 90.7 kg (200 lb).    On physical exam, the patient is a well-nourished, well-developed patient, in no acute distress, able to communicate without assistance in English language. Head and face is atraumatic and normocephalic. Salivary glands are intact. Facial strength is symmetrical bilaterally.       On ear examination:  Right ear: The patient has an open and patent ear canal. The neotympanum is intact. We removed some of the mastoid powder and skin debris  AC>BC  Left ear:  The neotympanum has some blunting, is thickened with a pinpoint perforation in the anterior inferior quadrant with mucoid effusion   BC>AC  The Gastelum is left    On vestibular exam, the patient has no spontaneous nystagmus, no headshake nystagmus, no head-thrust nystagmus, and no nystagmus on hyperventilation or Valsalva maneuvers. Nga-Hallpike maneuver is negative bilaterally.       On neuro exam, the patient is alert and oriented x3, cranial nerves are grossly intact, cerebellar exam is normal.      The rest of the exam, including anterior rhinoscopy, oropharyngeal exam, neck exam, and cardiovascular exam, were normal including no palpable lymphadenopathies, thyroid in the midline position, normal pulses, and normal chest excursion.       Reviewed Results:  Audiology Testing:  I personally reviewed the audiogram from 1/9/2025 that showed:  Right ear: moderate conductive hearing loss with  25-35dB ABG. Discrimination 92%.   Left ear: mild conductive hearing loss in the low and high frequencies with normal hearing in the mid frequencies, max ABG 15-20dB ABG at 500 and 4000 Hz. Discrimination 96%.       I personally reviewed the audiogram from 10/2024 that showed:            Right ear: mild conductive hearing loss with a 20db of air bone gap. Discrimination: 100%            Left ear: normal hearing . Discrimination: 100%       I reviewed her audiogram from 08/2023 that showed a mild conductive hearing loss on the " left side with closure of the air-borne gap within 10dB. On the right side, she has a moderate conductive hearing loss in the low frequencies upsloping to mild levels in the mid and high frequencies. She has a 25dB of air-borne gap in the low and mid frequencies on the right side. She has 100% discrimination bilaterally.       I reviewed her audiogram from 06/2022 that shows mild conductive hearing loss on the right side and a normal hearing on the left side with a 10 decibel air-bone gap. She has 100% discrimination on the right and 96% on the left. This is significant improvement.          I reviewed and interpreted the patient's audiogram 11/2021, which shows mild conductive hearing loss on the right side and moderate conductive hearing loss on the left side. The discrimination is 100% on the right and 96% on the left.    Imaging:  None     Procedure:  None    Assessment/Plan     1. 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 Conductive hearing loss, bilateral    3. Cleft lip    4. Tympanic membrane perforation, left    5. Dysfunction of both eustachian tubes    6. Vestibular migraine        In summary, Amanda Banks is a 29 y.o. 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     She is s/p left-sided tympanoplasty and canaloplasty with reconstruction of acquired stenosis of the ear canal with skin graft from the left arm. The skin of the ear canal  healed well for the first 1.5 years, but after an ear infection, there is a little bit of mucosalized drum anteriorly that currently dry and also a small tympanic membrane perforation.    As the right was her worse hearing ear, she elected to undergo surgery. We performed a tympanoplasty, canaloplasty, and repair of the acquired stenosis. The skin grafts on the right are taking. I removed some gentian violet and skin debris today.     She has vestibular migraines and she is taking amitriptyline 10 mg and nortryptiline 2.5 mg as needed. In  the last visit, she had exacerbation of vestibular migraines. We discussed going up on her medication she is scheduled to check this with neurology. She has an appointment scheduled.     She will continue with dry ear precautions.   She will use mastoid powder twice a week.    Follow-up in 6 weeks with an audiogram.          ____________________________________________________  Thierno Cano MD  Professor and Chief   Otology/Neurotology/Lateral Skull-Base Surgery   [facility]    Scribe Attestation  By signing my name below, I, Tracy Abreu, Scribe   attest that this documentation has been prepared under the direction and in the presence of Thierno Marrero MD.

## 2025-05-09 ENCOUNTER — APPOINTMENT (OUTPATIENT)
Dept: OTOLARYNGOLOGY | Facility: CLINIC | Age: 30
End: 2025-05-09
Payer: COMMERCIAL

## 2025-05-09 VITALS — BODY MASS INDEX: 32.14 KG/M2 | WEIGHT: 200 LBS | HEIGHT: 66 IN

## 2025-05-09 DIAGNOSIS — H61.303 ACQUIRED STENOSIS OF BOTH EXTERNAL EAR CANALS: Primary | ICD-10-CM

## 2025-05-09 DIAGNOSIS — H69.93 DYSFUNCTION OF BOTH EUSTACHIAN TUBES: ICD-10-CM

## 2025-05-09 DIAGNOSIS — H72.92 TYMPANIC MEMBRANE PERFORATION, LEFT: ICD-10-CM

## 2025-05-09 DIAGNOSIS — G43.809 VESTIBULAR MIGRAINE: ICD-10-CM

## 2025-05-09 DIAGNOSIS — Q36.9 CLEFT LIP: ICD-10-CM

## 2025-05-09 DIAGNOSIS — H90.0 CONDUCTIVE HEARING LOSS, BILATERAL: ICD-10-CM

## 2025-05-09 PROCEDURE — 3008F BODY MASS INDEX DOCD: CPT | Performed by: OTOLARYNGOLOGY

## 2025-05-09 PROCEDURE — 1036F TOBACCO NON-USER: CPT | Performed by: OTOLARYNGOLOGY

## 2025-05-09 PROCEDURE — 99024 POSTOP FOLLOW-UP VISIT: CPT | Performed by: OTOLARYNGOLOGY

## 2025-05-09 NOTE — LETTER
May 9, 2025     CARRIE Schneider-CNP  [address]  [facility] [id number]    Patient: Amanda Banks   YOB: 1995   Date of Visit: 5/9/2025       Dear CARRIE Olivo-CNP:    Thank you for referring Amanda Banks to me for evaluation. Below are my notes for this consultation.  If you have questions, please do not hesitate to call me. I look forward to following your patient along with you.       Sincerely,     Thierno Marrero MD      CC: No Recipients  ______________________________________________________________________________________            Reason for Consult:  Follow-up     Subjective  History Of Present Illness:  Amanda Banks is a 29 y.o. 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     She is s/p left-sided tympanoplasty and canaloplasty with reconstruction of acquired stenosis of the ear canal with skin graft from the left arm. The skin of the ear canal healed well fro the first 1.5 years. After an ear infection, the EAC started to mucolized and now has a small tympanic membrane perforation that we are watching.     On the right side, she continued to have conductive hearing loss. For that reason, I took her to the OR for a reconstruction of her EAC, tympanoplasty, and canalplasty without OCR on 02/25/2025.     During the procedure, I encountered:  1. Acquired stenosis of the medial external auditory canal, with fibrosis of the soft tissue of the bony EAC starting at the BC junction extending to the lateral surface of the tympanic membrane. Additional irregular regions of bony ear canal overgrowth, notably anteriorly. This resulted in blunting and lateralization of the drum, which ended in a blind pouch.   2. Malleus, incus and stapes intact, footplate mobile. Chorda tympani nerve visualized and left undisturbed.   3. Retained PE tube in the middle ear space, removed.  4.  Large canalplasty performed. Anterior canalplasty limited by TMJ, which was relatively posterior.  5. Inferior 2/3 of fibrous tympanic membrane preserved. Lateral to malleus underlay tympanoplasty technique used to cover inferior central perforation and pars flaccida region with temporalis fascia. Skin grafts placed to cover the walls of the external auditory canal.  6. 0.12 inch thickness skin graft from the right upper extremity was trimmed and placed over the walls of the external auditory canal.  7. Single incision meatoplasty performed. Ambrus ear wick placed to keep EAC patent.     Since surgery, she has been doing well. Her hearing has improved on the right. Her left ear remains relatively dry.      Past Medical History:  She has a past medical history of Adverse effect of anesthesia, Arrhythmia, blood clots, and Migraines.    Surgical History:  She has a past surgical history that includes Other surgical history; Tympanostomy tube placement; Lumbar fusion; Cosmetic surgery; Appendectomy; and Other surgical history.     Social History:  She reports that she has never smoked. She has never been exposed to tobacco smoke. She has never used smokeless tobacco. She reports that she does not currently use alcohol. She reports that she does not use drugs.    Family History:  family history is not on file.     Medications:  Current Outpatient Medications   Medication Instructions   • amitriptyline (ELAVIL) 10 mg   • ascorbic acid (VITAMIN C) 1,000 mg, Daily RT   • fluticasone (Flonase) 50 mcg/actuation nasal spray 2 sprays, Each Nostril, Daily, Shake gently. Before first use, prime pump. After use, clean tip and replace cap.   • GARLIC ORAL Take by mouth.   • metoprolol tartrate (Lopressor) 100 mg tablet 1 tablet, Every 12 hours scheduled (0630,1830)   • metoprolol tartrate (LOPRESSOR) 75 mg, Nightly   • multivitamin with iron (Child Multivitamin Plus Iron) chewable tablet Chew.   • naratriptan (AMERGE) 2.5 mg, Once  "as needed   • pantoprazole (PROTONIX) 40 mg   • tobramycin-dexamethasone (Tobradex) ophthalmic suspension ADMINISTER 4 DROPS INTO THE LEFT EAR TWICE A DAY FOR 10 DAYS.   • zinc gluconate 30 mg tablet Take by mouth.      Allergies:  Latex, natural rubber; Sulfa (sulfonamide antibiotics); and Percocet [oxycodone-acetaminophen]    Review of Systems:   A comprehensive 10-point review of systems was obtained including constitutional, neurological, HEENT, pulmonary, cardiovascular, genito-urinary, and other pertinent systems and was negative except as noted in the HPI.     Objective  Physical Exam:  Last Recorded Vitals: Height 1.676 m (5' 6\"), weight 90.7 kg (200 lb).    On physical exam, the patient is a well-nourished, well-developed patient, in no acute distress, able to communicate without assistance in English language. Head and face is atraumatic and normocephalic. Salivary glands are intact. Facial strength is symmetrical bilaterally.       On ear examination:  Right ear: The patient has an open and patent ear canal. The neotympanum is intact. We removed some of the mastoid powder and skin debris  AC>BC  Left ear:  The neotympanum has some blunting, is thickened with a pinpoint perforation in the anterior inferior quadrant with mucoid effusion   BC>AC  The Gastelum is left    On vestibular exam, the patient has no spontaneous nystagmus, no headshake nystagmus, no head-thrust nystagmus, and no nystagmus on hyperventilation or Valsalva maneuvers. Imperial-Hallpike maneuver is negative bilaterally.       On neuro exam, the patient is alert and oriented x3, cranial nerves are grossly intact, cerebellar exam is normal.      The rest of the exam, including anterior rhinoscopy, oropharyngeal exam, neck exam, and cardiovascular exam, were normal including no palpable lymphadenopathies, thyroid in the midline position, normal pulses, and normal chest excursion.       Reviewed Results:  Audiology Testing:  I personally reviewed the " audiogram from 1/9/2025 that showed:  Right ear: moderate conductive hearing loss with  25-35dB ABG. Discrimination 92%.   Left ear: mild conductive hearing loss in the low and high frequencies with normal hearing in the mid frequencies, max ABG 15-20dB ABG at 500 and 4000 Hz. Discrimination 96%.       I personally reviewed the audiogram from 10/2024 that showed:            Right ear: mild conductive hearing loss with a 20db of air bone gap. Discrimination: 100%            Left ear: normal hearing . Discrimination: 100%       I reviewed her audiogram from 08/2023 that showed a mild conductive hearing loss on the left side with closure of the air-borne gap within 10dB. On the right side, she has a moderate conductive hearing loss in the low frequencies upsloping to mild levels in the mid and high frequencies. She has a 25dB of air-borne gap in the low and mid frequencies on the right side. She has 100% discrimination bilaterally.       I reviewed her audiogram from 06/2022 that shows mild conductive hearing loss on the right side and a normal hearing on the left side with a 10 decibel air-bone gap. She has 100% discrimination on the right and 96% on the left. This is significant improvement.          I reviewed and interpreted the patient's audiogram 11/2021, which shows mild conductive hearing loss on the right side and moderate conductive hearing loss on the left side. The discrimination is 100% on the right and 96% on the left.    Imaging:  None     Procedure:  None    Assessment/Plan    1. 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 Conductive hearing loss, bilateral    3. Cleft lip    4. Tympanic membrane perforation, left    5. Dysfunction of both eustachian tubes    6. Vestibular migraine        In summary, Amanda Banks is a 29 y.o. 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     She is s/p left-sided tympanoplasty and canaloplasty with reconstruction of acquired stenosis of  the ear canal with skin graft from the left arm. The skin of the ear canal  healed well for the first 1.5 years, but after an ear infection, there is a little bit of mucosalized drum anteriorly that currently dry and also a small tympanic membrane perforation.    As the right was her worse hearing ear, she elected to undergo surgery. We performed a tympanoplasty, canaloplasty, and repair of the acquired stenosis. The skin grafts on the right are taking. I removed some gentian violet and skin debris today.     She has vestibular migraines and she is taking amitriptyline 10 mg and nortryptiline 2.5 mg as needed. In the last visit, she had exacerbation of vestibular migraines. We discussed going up on her medication she is scheduled to check this with neurology. She has an appointment scheduled.     She will continue with dry ear precautions.   She will use mastoid powder twice a week.    Follow-up in 6 weeks with an audiogram.          ____________________________________________________  Thierno Cano MD  Professor and Chief   Otology/Neurotology/Lateral Skull-Base Surgery   [facility]    Scribe Attestation  By signing my name below, I, Tracy Abreu, Scribe   attest that this documentation has been prepared under the direction and in the presence of Thierno Marrero MD.

## 2025-05-28 NOTE — PROGRESS NOTES
Reason for Consult:  Hearing Loss (Right ear, with pressure and pain )     Subjective   History Of Present Illness:  Amanda Banks is a 29 y.o. 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     She is s/p left-sided tympanoplasty and canaloplasty with reconstruction of acquired stenosis of the ear canal with skin graft from the left arm. The skin of the ear canal healed well for the first 1.5 years. After an ear infection, the EAC started to mucolized and now has a small tympanic membrane perforation that we are watching.     On the right side, she continued to have conductive hearing loss. For that reason, I took her to the OR for a reconstruction of her EAC, tympanoplasty, and canalplasty without OCR on 02/25/2025.     During the procedure, I encountered:  1. Acquired stenosis of the medial external auditory canal, with fibrosis of the soft tissue of the bony EAC starting at the BC junction extending to the lateral surface of the tympanic membrane. Additional irregular regions of bony ear canal overgrowth, notably anteriorly. This resulted in blunting and lateralization of the drum, which ended in a blind pouch.   2. Malleus, incus and stapes intact, footplate mobile. Chorda tympani nerve visualized and left undisturbed.   3. Retained PE tube in the middle ear space, removed.  4. Large canalplasty performed. Anterior canalplasty limited by TMJ, which was relatively posterior.  5. Inferior 2/3 of fibrous tympanic membrane preserved. Lateral to malleus underlay tympanoplasty technique used to cover inferior central perforation and pars flaccida region with temporalis fascia. Skin grafts placed to cover the walls of the external auditory canal.  6. 0.12 inch thickness skin graft from the right upper extremity was trimmed and placed over the walls of the external auditory canal.  7. Single incision meatoplasty performed. Ambrus ear wick placed to  keep EAC patent.     Since surgery, she has been doing well. Her hearing has improved on the right. Her left ear remains relatively dry. She noticed some drainage and ear fullness on the right side.     Past Medical History:  She has a past medical history of Adverse effect of anesthesia, Arrhythmia, blood clots, and Migraines.    Surgical History:  She has a past surgical history that includes Other surgical history; Tympanostomy tube placement; Lumbar fusion; Cosmetic surgery; Appendectomy; and Other surgical history.     Social History:  She reports that she has never smoked. She has never been exposed to tobacco smoke. She has never used smokeless tobacco. She reports that she does not currently use alcohol. She reports that she does not use drugs.    Family History:  family history is not on file.     Medications:  Current Outpatient Medications   Medication Instructions    amitriptyline (ELAVIL) 10 mg    ascorbic acid (VITAMIN C) 1,000 mg, Daily RT    fluticasone (Flonase) 50 mcg/actuation nasal spray 2 sprays, Each Nostril, Daily, Shake gently. Before first use, prime pump. After use, clean tip and replace cap.    GARLIC ORAL Take by mouth.    metoprolol tartrate (Lopressor) 100 mg tablet 1 tablet, Every 12 hours scheduled (0630,1830)    metoprolol tartrate (LOPRESSOR) 75 mg, Nightly    multivitamin with iron (Child Multivitamin Plus Iron) chewable tablet Chew.    naratriptan (AMERGE) 2.5 mg, Once as needed    pantoprazole (PROTONIX) 40 mg    tobramycin-dexamethasone (Tobradex) ophthalmic suspension     zinc gluconate 30 mg tablet Take by mouth.      Allergies:  Latex, natural rubber; Sulfa (sulfonamide antibiotics); and Percocet [oxycodone-acetaminophen]    Review of Systems:   A comprehensive 10-point review of systems was obtained including constitutional, neurological, HEENT, pulmonary, cardiovascular, genito-urinary, and other pertinent systems and was negative except as noted in the HPI.     Objective    Physical Exam:  Last Recorded Vitals: There were no vitals taken for this visit.    On physical exam, the patient is a well-nourished, well-developed patient, in no acute distress, able to communicate without assistance in English language. Head and face is atraumatic and normocephalic. Salivary glands are intact. Facial strength is symmetrical bilaterally.       On ear examination:  Right ear: The patient has an open and patent ear canal. The neotympanum is intact. We removed some of the mastoid powder and skin debris   AC>BC  Left ear:   The neotympanum has some blunting, is thickened with a pinpoint perforation in the anterior inferior quadrant with mucoid effusion   BC>AC  The Gastelum is left    On neuro exam, the patient is alert and oriented x3, cranial nerves are grossly intact, cerebellar exam is normal.      The rest of the exam, including anterior rhinoscopy, oropharyngeal exam, neck exam, and cardiovascular exam, were normal including no palpable lymphadenopathies, thyroid in the midline position, normal pulses, and normal chest excursion.       Reviewed Results:  I personally reviewed the audiogram from 1/9/2025 that showed:  Right ear: moderate conductive hearing loss with  25-35dB ABG. Discrimination 92%.   Left ear: mild conductive hearing loss in the low and high frequencies with normal hearing in the mid frequencies, max ABG 15-20dB ABG at 500 and 4000 Hz. Discrimination 96%.       I personally reviewed the audiogram from 10/2024 that showed:            Right ear: mild conductive hearing loss with a 20db of air bone gap. Discrimination: 100%            Left ear: normal hearing . Discrimination: 100%       I reviewed her audiogram from 08/2023 that showed a mild conductive hearing loss on the left side with closure of the air-borne gap within 10dB. On the right side, she has a moderate conductive hearing loss in the low frequencies upsloping to mild levels in the mid and high frequencies. She has a  25dB of air-borne gap in the low and mid frequencies on the right side. She has 100% discrimination bilaterally.       I reviewed her audiogram from 06/2022 that shows mild conductive hearing loss on the right side and a normal hearing on the left side with a 10 decibel air-bone gap. She has 100% discrimination on the right and 96% on the left. This is significant improvement.          I reviewed and interpreted the patient's audiogram 11/2021, which shows mild conductive hearing loss on the right side and moderate conductive hearing loss on the left side. The discrimination is 100% on the right and 96% on the left.    Imaging:     Procedure:  None    Assessment/Plan     1. 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 Conductive hearing loss, bilateral    3. Cleft lip    4. Tympanic membrane perforation, left    5. Dysfunction of both eustachian tubes    6. Vestibular migraine        In summary, Amanda Banks is a 29 y.o. 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     She is s/p left-sided tympanoplasty and canaloplasty with reconstruction of acquired stenosis of the ear canal with skin graft from the left arm. The skin of the ear canal  healed well for the first 1.5 years, but after an ear infection, there is a little bit of mucosalized drum anteriorly that currently dry and also a small tympanic membrane perforation.     As the right was her worse hearing ear, she elected to undergo surgery. We performed a tympanoplasty, canaloplasty, and repair of the acquired stenosis. The skin grafts on the right are taking. I removed some mastoid powder and skin debris today.  Gentian violet was placed in the right ear. She will stop using her mastoid powder.    - Maintain already scheduled follow up appointment on 6/16/24      ____________________________________________________  Thierno Cano MD  Professor and Chief   Otology/Neurotology/Lateral Skull-Base Surgery   [facility]  [facility]    Scribe Attestation  By signing my name below, I, Tracy Abreu, Scribe   attest that this documentation has been prepared under the direction and in the presence of Thierno Marrero MD.

## 2025-05-29 ENCOUNTER — OFFICE VISIT (OUTPATIENT)
Dept: OTOLARYNGOLOGY | Facility: CLINIC | Age: 30
End: 2025-05-29
Payer: COMMERCIAL

## 2025-05-29 DIAGNOSIS — H61.303 ACQUIRED STENOSIS OF BOTH EXTERNAL EAR CANALS: Primary | ICD-10-CM

## 2025-05-29 DIAGNOSIS — H72.92 TYMPANIC MEMBRANE PERFORATION, LEFT: ICD-10-CM

## 2025-05-29 DIAGNOSIS — H61.21 IMPACTED CERUMEN OF RIGHT EAR: ICD-10-CM

## 2025-05-29 DIAGNOSIS — H69.93 DYSFUNCTION OF BOTH EUSTACHIAN TUBES: ICD-10-CM

## 2025-05-29 DIAGNOSIS — H90.0 CONDUCTIVE HEARING LOSS, BILATERAL: ICD-10-CM

## 2025-05-29 DIAGNOSIS — G43.809 VESTIBULAR MIGRAINE: ICD-10-CM

## 2025-05-29 DIAGNOSIS — Q36.9 CLEFT LIP: ICD-10-CM

## 2025-05-29 PROCEDURE — 99213 OFFICE O/P EST LOW 20 MIN: CPT | Performed by: OTOLARYNGOLOGY

## 2025-05-29 PROCEDURE — 69210 REMOVE IMPACTED EAR WAX UNI: CPT | Performed by: OTOLARYNGOLOGY

## 2025-05-29 PROCEDURE — 1036F TOBACCO NON-USER: CPT | Performed by: OTOLARYNGOLOGY

## 2025-05-29 ASSESSMENT — PATIENT HEALTH QUESTIONNAIRE - PHQ9
2. FEELING DOWN, DEPRESSED OR HOPELESS: NOT AT ALL
SUM OF ALL RESPONSES TO PHQ9 QUESTIONS 1 AND 2: 0
1. LITTLE INTEREST OR PLEASURE IN DOING THINGS: NOT AT ALL

## 2025-05-29 NOTE — Clinical Note
May 29, 2025     No Recipients    Patient: Amanda Banks   YOB: 1995   Date of Visit: 5/29/2025       Dear Dr. Siegel Recipients:    Thank you for referring Amanda Banks to me for evaluation. Below are my notes for this consultation.  If you have questions, please do not hesitate to call me. I look forward to following your patient along with you.       Sincerely,     Thierno Marrero MD      CC: No Recipients  ______________________________________________________________________________________            Reason for Consult:  Hearing Loss (Right ear, with pressure and pain )     Subjective  History Of Present Illness:  Amanda Banks is a 29 y.o. 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     She is s/p left-sided tympanoplasty and canaloplasty with reconstruction of acquired stenosis of the ear canal with skin graft from the left arm. The skin of the ear canal healed well for the first 1.5 years. After an ear infection, the EAC started to mucolized and now has a small tympanic membrane perforation that we are watching.     On the right side, she continued to have conductive hearing loss. For that reason, I took her to the OR for a reconstruction of her EAC, tympanoplasty, and canalplasty without OCR on 02/25/2025.     During the procedure, I encountered:  1. Acquired stenosis of the medial external auditory canal, with fibrosis of the soft tissue of the bony EAC starting at the BC junction extending to the lateral surface of the tympanic membrane. Additional irregular regions of bony ear canal overgrowth, notably anteriorly. This resulted in blunting and lateralization of the drum, which ended in a blind pouch.   2. Malleus, incus and stapes intact, footplate mobile. Chorda tympani nerve visualized and left undisturbed.   3. Retained PE tube in the middle ear space, removed.  4. Large canalplasty performed. Anterior canalplasty  limited by TMJ, which was relatively posterior.  5. Inferior 2/3 of fibrous tympanic membrane preserved. Lateral to malleus underlay tympanoplasty technique used to cover inferior central perforation and pars flaccida region with temporalis fascia. Skin grafts placed to cover the walls of the external auditory canal.  6. 0.12 inch thickness skin graft from the right upper extremity was trimmed and placed over the walls of the external auditory canal.  7. Single incision meatoplasty performed. Ambrus ear wick placed to keep EAC patent.     Since surgery, she has been doing well. Her hearing has improved on the right. Her left ear remains relatively dry. She noticed some drainage and ear fullness on the right side.     Past Medical History:  She has a past medical history of Adverse effect of anesthesia, Arrhythmia, blood clots, and Migraines.    Surgical History:  She has a past surgical history that includes Other surgical history; Tympanostomy tube placement; Lumbar fusion; Cosmetic surgery; Appendectomy; and Other surgical history.     Social History:  She reports that she has never smoked. She has never been exposed to tobacco smoke. She has never used smokeless tobacco. She reports that she does not currently use alcohol. She reports that she does not use drugs.    Family History:  family history is not on file.     Medications:  Current Outpatient Medications   Medication Instructions    amitriptyline (ELAVIL) 10 mg    ascorbic acid (VITAMIN C) 1,000 mg, Daily RT    fluticasone (Flonase) 50 mcg/actuation nasal spray 2 sprays, Each Nostril, Daily, Shake gently. Before first use, prime pump. After use, clean tip and replace cap.    GARLIC ORAL Take by mouth.    metoprolol tartrate (Lopressor) 100 mg tablet 1 tablet, Every 12 hours scheduled (0630,1830)    metoprolol tartrate (LOPRESSOR) 75 mg, Nightly    multivitamin with iron (Child Multivitamin Plus Iron) chewable tablet Chew.    naratriptan (AMERGE) 2.5 mg,  Once as needed    pantoprazole (PROTONIX) 40 mg    tobramycin-dexamethasone (Tobradex) ophthalmic suspension     zinc gluconate 30 mg tablet Take by mouth.      Allergies:  Latex, natural rubber; Sulfa (sulfonamide antibiotics); and Percocet [oxycodone-acetaminophen]    Review of Systems:   A comprehensive 10-point review of systems was obtained including constitutional, neurological, HEENT, pulmonary, cardiovascular, genito-urinary, and other pertinent systems and was negative except as noted in the HPI.     Objective  Physical Exam:  Last Recorded Vitals: There were no vitals taken for this visit.    On physical exam, the patient is a well-nourished, well-developed patient, in no acute distress, able to communicate without assistance in English language. Head and face is atraumatic and normocephalic. Salivary glands are intact. Facial strength is symmetrical bilaterally.       On ear examination:  Right ear: The patient has an open and patent ear canal. The neotympanum is intact. We removed some of the mastoid powder and skin debris   AC>BC  Left ear:   The neotympanum has some blunting, is thickened with a pinpoint perforation in the anterior inferior quadrant with mucoid effusion   BC>AC  The Gastelum is left    On neuro exam, the patient is alert and oriented x3, cranial nerves are grossly intact, cerebellar exam is normal.      The rest of the exam, including anterior rhinoscopy, oropharyngeal exam, neck exam, and cardiovascular exam, were normal including no palpable lymphadenopathies, thyroid in the midline position, normal pulses, and normal chest excursion.       Reviewed Results:  I personally reviewed the audiogram from 1/9/2025 that showed:  Right ear: moderate conductive hearing loss with  25-35dB ABG. Discrimination 92%.   Left ear: mild conductive hearing loss in the low and high frequencies with normal hearing in the mid frequencies, max ABG 15-20dB ABG at 500 and 4000 Hz. Discrimination 96%.       I  personally reviewed the audiogram from 10/2024 that showed:            Right ear: mild conductive hearing loss with a 20db of air bone gap. Discrimination: 100%            Left ear: normal hearing . Discrimination: 100%       I reviewed her audiogram from 08/2023 that showed a mild conductive hearing loss on the left side with closure of the air-borne gap within 10dB. On the right side, she has a moderate conductive hearing loss in the low frequencies upsloping to mild levels in the mid and high frequencies. She has a 25dB of air-borne gap in the low and mid frequencies on the right side. She has 100% discrimination bilaterally.       I reviewed her audiogram from 06/2022 that shows mild conductive hearing loss on the right side and a normal hearing on the left side with a 10 decibel air-bone gap. She has 100% discrimination on the right and 96% on the left. This is significant improvement.          I reviewed and interpreted the patient's audiogram 11/2021, which shows mild conductive hearing loss on the right side and moderate conductive hearing loss on the left side. The discrimination is 100% on the right and 96% on the left.    Imaging:     Procedure:  None    Assessment/Plan    1. 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 Conductive hearing loss, bilateral    3. Cleft lip    4. Tympanic membrane perforation, left    5. Dysfunction of both eustachian tubes    6. Vestibular migraine        In summary, Amanda Banks is a 29 y.o. 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     She is s/p left-sided tympanoplasty and canaloplasty with reconstruction of acquired stenosis of the ear canal with skin graft from the left arm. The skin of the ear canal  healed well for the first 1.5 years, but after an ear infection, there is a little bit of mucosalized drum anteriorly that currently dry and also a small tympanic membrane perforation.     As the right was her worse hearing ear, she  elected to undergo surgery. We performed a tympanoplasty, canaloplasty, and repair of the acquired stenosis. The skin grafts on the right are taking. I removed some mastoid powder and skin debris today.  Gentian violet was placed in the right ear. She will stop using her mastoid powder.    - Maintain already scheduled follow up appointment on 6/16/24      ____________________________________________________  Thierno Cano MD  Professor and Chief   Otology/Neurotology/Lateral Skull-Base Surgery       Scribe Attestation  By signing my name below, I, Fuentesariellewilian Abreu, Scribe   attest that this documentation has been prepared under the direction and in the presence of Thierno Marrero MD.

## 2025-06-16 ENCOUNTER — CLINICAL SUPPORT (OUTPATIENT)
Dept: AUDIOLOGY | Facility: CLINIC | Age: 30
End: 2025-06-16
Payer: COMMERCIAL

## 2025-06-16 ENCOUNTER — APPOINTMENT (OUTPATIENT)
Dept: OTOLARYNGOLOGY | Facility: CLINIC | Age: 30
End: 2025-06-16
Payer: COMMERCIAL

## 2025-06-16 ENCOUNTER — APPOINTMENT (OUTPATIENT)
Dept: AUDIOLOGY | Facility: CLINIC | Age: 30
End: 2025-06-16
Payer: COMMERCIAL

## 2025-06-16 VITALS — TEMPERATURE: 97.9 F | WEIGHT: 199 LBS | HEIGHT: 66 IN | BODY MASS INDEX: 31.98 KG/M2

## 2025-06-16 DIAGNOSIS — H69.93 DYSFUNCTION OF BOTH EUSTACHIAN TUBES: ICD-10-CM

## 2025-06-16 DIAGNOSIS — H61.303 ACQUIRED STENOSIS OF BOTH EXTERNAL EAR CANALS: Primary | ICD-10-CM

## 2025-06-16 DIAGNOSIS — H61.23 BILATERAL IMPACTED CERUMEN: ICD-10-CM

## 2025-06-16 DIAGNOSIS — H90.0 CONDUCTIVE HEARING LOSS, BILATERAL: ICD-10-CM

## 2025-06-16 DIAGNOSIS — H61.303 ACQUIRED STENOSIS OF BOTH EXTERNAL EAR CANALS: ICD-10-CM

## 2025-06-16 DIAGNOSIS — Q37.9: ICD-10-CM

## 2025-06-16 DIAGNOSIS — H90.0 CONDUCTIVE HEARING LOSS, BILATERAL: Primary | ICD-10-CM

## 2025-06-16 DIAGNOSIS — H72.92 TYMPANIC MEMBRANE PERFORATION, LEFT: ICD-10-CM

## 2025-06-16 PROCEDURE — 99213 OFFICE O/P EST LOW 20 MIN: CPT | Performed by: OTOLARYNGOLOGY

## 2025-06-16 PROCEDURE — 3008F BODY MASS INDEX DOCD: CPT | Performed by: OTOLARYNGOLOGY

## 2025-06-16 PROCEDURE — 92567 TYMPANOMETRY: CPT | Performed by: AUDIOLOGIST

## 2025-06-16 PROCEDURE — 92557 COMPREHENSIVE HEARING TEST: CPT | Performed by: AUDIOLOGIST

## 2025-06-16 PROCEDURE — 69210 REMOVE IMPACTED EAR WAX UNI: CPT | Performed by: OTOLARYNGOLOGY

## 2025-06-16 RX ORDER — CEPHALEXIN 500 MG/1
CAPSULE ORAL
COMMUNITY

## 2025-06-16 RX ORDER — ONDANSETRON 4 MG/1
TABLET, FILM COATED ORAL
COMMUNITY

## 2025-06-16 RX ORDER — GLUCOSAM/CHONDRO/HERB 149/HYAL 750-100 MG
TABLET ORAL EVERY 12 HOURS
COMMUNITY

## 2025-06-16 ASSESSMENT — PATIENT HEALTH QUESTIONNAIRE - PHQ9
1. LITTLE INTEREST OR PLEASURE IN DOING THINGS: NOT AT ALL
SUM OF ALL RESPONSES TO PHQ9 QUESTIONS 1 & 2: 0
2. FEELING DOWN, DEPRESSED OR HOPELESS: NOT AT ALL

## 2025-06-16 ASSESSMENT — PAIN SCALES - GENERAL: PAINLEVEL_OUTOF10: 0-NO PAIN

## 2025-06-16 NOTE — PROGRESS NOTES
Reason for Consult:  Follow-up (Bilateral ear evaluation.)     Subjective   History Of Present Illness:  Amanda Banks is a 30 y.o. 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     She is s/p left-sided tympanoplasty and canaloplasty with reconstruction of acquired stenosis of the ear canal with skin graft from the left arm. The skin of the ear canal healed well for the first 1.5 years. After an ear infection, the EAC started to mucolized and now has a small tympanic membrane perforation that we are watching.     On the right side, she continued to have conductive hearing loss. For that reason, I took her to the OR for a reconstruction of her EAC, tympanoplasty, and canalplasty without OCR on 02/25/2025.     During the procedure, I encountered:  1. Acquired stenosis of the medial external auditory canal, with fibrosis of the soft tissue of the bony EAC starting at the BC junction extending to the lateral surface of the tympanic membrane. Additional irregular regions of bony ear canal overgrowth, notably anteriorly. This resulted in blunting and lateralization of the drum, which ended in a blind pouch.   2. Malleus, incus and stapes intact, footplate mobile. Chorda tympani nerve visualized and left undisturbed.   3. Retained PE tube in the middle ear space, removed.  4. Large canalplasty performed. Anterior canalplasty limited by TMJ, which was relatively posterior.  5. Inferior 2/3 of fibrous tympanic membrane preserved. Lateral to malleus underlay tympanoplasty technique used to cover inferior central perforation and pars flaccida region with temporalis fascia. Skin grafts placed to cover the walls of the external auditory canal.  6. 0.12 inch thickness skin graft from the right upper extremity was trimmed and placed over the walls of the external auditory canal.  7. Single incision meatoplasty performed. Ambrus ear wick placed to keep EAC  patent.     Since surgery, she has been doing well. She does feel her right ear is muffled.    Past Medical History:  She has a past medical history of Adverse effect of anesthesia, Arrhythmia, blood clots, and Migraines.    Surgical History:  She has a past surgical history that includes Other surgical history; Tympanostomy tube placement; Lumbar fusion; Cosmetic surgery; Appendectomy; and Other surgical history.     Social History:  She reports that she has never smoked. She has never been exposed to tobacco smoke. She has never used smokeless tobacco. She reports that she does not currently use alcohol. She reports that she does not use drugs.    Family History:  family history is not on file.     Medications:  Current Outpatient Medications   Medication Instructions    amitriptyline (ELAVIL) 10 mg    ascorbic acid (VITAMIN C) 1,000 mg, Daily RT    cephalexin (Keflex) 500 mg capsule     fluticasone (Flonase) 50 mcg/actuation nasal spray 2 sprays, Each Nostril, Daily, Shake gently. Before first use, prime pump. After use, clean tip and replace cap.    GARLIC ORAL Take by mouth.    metoprolol tartrate (Lopressor) 100 mg tablet 1 tablet, Every 12 hours scheduled (0630,1830)    multivitamin with iron (Child Multivitamin Plus Iron) chewable tablet Chew.    naratriptan (AMERGE) 2.5 mg, Once as needed    omega 3-dha-epa-fish oil (Fish OiL) 1,000 (120-180) mg capsule Every 12 hours    ondansetron (Zofran) 4 mg tablet     pantoprazole (PROTONIX) 40 mg    tobramycin-dexamethasone (Tobradex) ophthalmic suspension     zinc gluconate 30 mg tablet Take by mouth.      Allergies:  Latex, natural rubber; Sulfa (sulfonamide antibiotics); and Percocet [oxycodone-acetaminophen]    Review of Systems:   A comprehensive 10-point review of systems was obtained including constitutional, neurological, HEENT, pulmonary, cardiovascular, genito-urinary, and other pertinent systems and was negative except as noted in the HPI.     Objective  "  Physical Exam:  Last Recorded Vitals: Temperature 36.6 °C (97.9 °F), temperature source Temporal, height 1.676 m (5' 6\"), weight 90.3 kg (199 lb).    On physical exam, the patient is a well-nourished, well-developed patient, in no acute distress, able to communicate without assistance in English language. Head and face is atraumatic and normocephalic. Salivary glands are intact. Facial strength is symmetrical bilaterally.       On ear examination:  Right ear: The patient has cerumen impaction which was removed. The neotympanum is intact. Some skin debris was removed  AC>BC  Left ear: The neotympanum has some blunting, is thickened with a pinpoint perforation in the anterior inferior quadrant with mucoid effusion in the anterior quadrant  BC>AC  The Gastelum is left    On vestibular exam, the patient has no spontaneous nystagmus, no headshake nystagmus, no head-thrust nystagmus, and no nystagmus on hyperventilation or Valsalva maneuvers. Nga-Hallpike maneuver is negative bilaterally.       On neuro exam, the patient is alert and oriented x3, cranial nerves are grossly intact, cerebellar exam is normal.      The rest of the exam, including anterior rhinoscopy, oropharyngeal exam, neck exam, and cardiovascular exam, were normal including no palpable lymphadenopathies, thyroid in the midline position, normal pulses, and normal chest excursion.       Reviewed Results:  Audiology Testing:  I personally reviewed the audiogram from 6/2025 that showed:  Right ear: moderate conductive hearing loss with 15dB ABG. Discrimination 100%.   Left ear: mild conductive hearing loss in the low and high frequencies with normal hearing in the mid frequencies, 10 dB of air-bone gap. Discrimination 100%.      I personally reviewed the audiogram from 1/9/2025 that showed:  Right ear: moderate conductive hearing loss with  25-35dB ABG. Discrimination 92%.   Left ear: mild conductive hearing loss in the low and high frequencies with normal " hearing in the mid frequencies, max ABG 15-20dB ABG at 500 and 4000 Hz. Discrimination 96%.       I personally reviewed the audiogram from 10/2024 that showed:            Right ear: mild conductive hearing loss with a 20db of air bone gap. Discrimination: 100%            Left ear: normal hearing . Discrimination: 100%       I reviewed her audiogram from 08/2023 that showed a mild conductive hearing loss on the left side with closure of the air-borne gap within 10dB. On the right side, she has a moderate conductive hearing loss in the low frequencies upsloping to mild levels in the mid and high frequencies. She has a 25dB of air-borne gap in the low and mid frequencies on the right side. She has 100% discrimination bilaterally.       I reviewed her audiogram from 06/2022 that shows mild conductive hearing loss on the right side and a normal hearing on the left side with a 10 decibel air-bone gap. She has 100% discrimination on the right and 96% on the left. This is significant improvement.          I reviewed and interpreted the patient's audiogram 11/2021, which shows mild conductive hearing loss on the right side and moderate conductive hearing loss on the left side. The discrimination is 100% on the right and 96% on the left.       Procedure:  None    Assessment/Plan     1. 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 Conductive hearing loss, bilateral    3. Tympanic membrane perforation, left    4. Dysfunction of both eustachian tubes    5. Cleft palate and lip ([facility])    6. Bilateral impacted cerumen        In summary, Amanda Banks is a 30 y.o. 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     She is s/p left-sided tympanoplasty and canaloplasty with reconstruction of acquired stenosis of the ear canal with skin graft from the left arm. The skin of the ear canal  healed well for the first 1.5 years, but after an ear infection, there is a little bit of mucosalized drum anteriorly  that currently dry and also a small tympanic membrane perforation.     As the right was her worse hearing ear, she elected to undergo surgery. We performed a tympanoplasty, canaloplasty, and repair of the acquired stenosis. The skin grafts on the right are taking. I cleaned some of the skin debris. The judd tympanum is in good condition. We achieved closure of the air-bone gap within 15 dB.    Follow-up in 6 weeks.       ____________________________________________________  Thierno Cano MD  Professor and Chief   Otology/Neurotology/Lateral Skull-Base Surgery   [facility]    Scribe Attestation  By signing my name below, I, Tracy Abreu, Scribe   attest that this documentation has been prepared under the direction and in the presence of Thierno Marrero MD.

## 2025-06-16 NOTE — LETTER
June 30, 2025     CARIRE Schneider-CNP  [address]  [facility] [address]    Patient: Amanda Banks   YOB: 1995   Date of Visit: 6/16/2025       Dear CARRIE Olivo-CNP:    Thank you for referring Amanda Banks to me for evaluation. Below are my notes for this consultation.  If you have questions, please do not hesitate to call me. I look forward to following your patient along with you.       Sincerely,     Thierno Marrero MD      CC: No Recipients  ______________________________________________________________________________________            Reason for Consult:  Follow-up (Bilateral ear evaluation.)     Subjective  History Of Present Illness:  Amanda Banks is a 30 y.o. 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     She is s/p left-sided tympanoplasty and canaloplasty with reconstruction of acquired stenosis of the ear canal with skin graft from the left arm. The skin of the ear canal healed well for the first 1.5 years. After an ear infection, the EAC started to mucolized and now has a small tympanic membrane perforation that we are watching.     On the right side, she continued to have conductive hearing loss. For that reason, I took her to the OR for a reconstruction of her EAC, tympanoplasty, and canalplasty without OCR on 02/25/2025.     During the procedure, I encountered:  1. Acquired stenosis of the medial external auditory canal, with fibrosis of the soft tissue of the bony EAC starting at the BC junction extending to the lateral surface of the tympanic membrane. Additional irregular regions of bony ear canal overgrowth, notably anteriorly. This resulted in blunting and lateralization of the drum, which ended in a blind pouch.   2. Malleus, incus and stapes intact, footplate mobile. Chorda tympani nerve visualized and left undisturbed.   3. Retained PE tube in the  middle ear space, removed.  4. Large canalplasty performed. Anterior canalplasty limited by TMJ, which was relatively posterior.  5. Inferior 2/3 of fibrous tympanic membrane preserved. Lateral to malleus underlay tympanoplasty technique used to cover inferior central perforation and pars flaccida region with temporalis fascia. Skin grafts placed to cover the walls of the external auditory canal.  6. 0.12 inch thickness skin graft from the right upper extremity was trimmed and placed over the walls of the external auditory canal.  7. Single incision meatoplasty performed. Ambrus ear wick placed to keep EAC patent.     Since surgery, she has been doing well. She does feel her right ear is muffled.    Past Medical History:  She has a past medical history of Adverse effect of anesthesia, Arrhythmia, blood clots, and Migraines.    Surgical History:  She has a past surgical history that includes Other surgical history; Tympanostomy tube placement; Lumbar fusion; Cosmetic surgery; Appendectomy; and Other surgical history.     Social History:  She reports that she has never smoked. She has never been exposed to tobacco smoke. She has never used smokeless tobacco. She reports that she does not currently use alcohol. She reports that she does not use drugs.    Family History:  family history is not on file.     Medications:  Current Outpatient Medications   Medication Instructions   • amitriptyline (ELAVIL) 10 mg   • ascorbic acid (VITAMIN C) 1,000 mg, Daily RT   • cephalexin (Keflex) 500 mg capsule    • fluticasone (Flonase) 50 mcg/actuation nasal spray 2 sprays, Each Nostril, Daily, Shake gently. Before first use, prime pump. After use, clean tip and replace cap.   • GARLIC ORAL Take by mouth.   • metoprolol tartrate (Lopressor) 100 mg tablet 1 tablet, Every 12 hours scheduled (0630,1830)   • multivitamin with iron (Child Multivitamin Plus Iron) chewable tablet Chew.   • naratriptan (AMERGE) 2.5 mg, Once as needed   •  "omega 3-dha-epa-fish oil (Fish OiL) 1,000 (120-180) mg capsule Every 12 hours   • ondansetron (Zofran) 4 mg tablet    • pantoprazole (PROTONIX) 40 mg   • tobramycin-dexamethasone (Tobradex) ophthalmic suspension    • zinc gluconate 30 mg tablet Take by mouth.      Allergies:  Latex, natural rubber; Sulfa (sulfonamide antibiotics); and Percocet [oxycodone-acetaminophen]    Review of Systems:   A comprehensive 10-point review of systems was obtained including constitutional, neurological, HEENT, pulmonary, cardiovascular, genito-urinary, and other pertinent systems and was negative except as noted in the HPI.     Objective  Physical Exam:  Last Recorded Vitals: Temperature 36.6 °C (97.9 °F), temperature source Temporal, height 1.676 m (5' 6\"), weight 90.3 kg (199 lb).    On physical exam, the patient is a well-nourished, well-developed patient, in no acute distress, able to communicate without assistance in English language. Head and face is atraumatic and normocephalic. Salivary glands are intact. Facial strength is symmetrical bilaterally.       On ear examination:  Right ear: The patient has cerumen impaction which was removed. The neotympanum is intact. Some skin debris was removed  AC>BC  Left ear: The neotympanum has some blunting, is thickened with a pinpoint perforation in the anterior inferior quadrant with mucoid effusion in the anterior quadrant  BC>AC  The Gastelum is left    On vestibular exam, the patient has no spontaneous nystagmus, no headshake nystagmus, no head-thrust nystagmus, and no nystagmus on hyperventilation or Valsalva maneuvers. [place]-Hallpike maneuver is negative bilaterally.       On neuro exam, the patient is alert and oriented x3, cranial nerves are grossly intact, cerebellar exam is normal.      The rest of the exam, including anterior rhinoscopy, oropharyngeal exam, neck exam, and cardiovascular exam, were normal including no palpable lymphadenopathies, thyroid in the midline position, " normal pulses, and normal chest excursion.       Reviewed Results:  Audiology Testing:  I personally reviewed the audiogram from 6/2025 that showed:  Right ear: moderate conductive hearing loss with 15dB ABG. Discrimination 100%.   Left ear: mild conductive hearing loss in the low and high frequencies with normal hearing in the mid frequencies, 10 dB of air-bone gap. Discrimination 100%.      I personally reviewed the audiogram from 1/9/2025 that showed:  Right ear: moderate conductive hearing loss with  25-35dB ABG. Discrimination 92%.   Left ear: mild conductive hearing loss in the low and high frequencies with normal hearing in the mid frequencies, max ABG 15-20dB ABG at 500 and 4000 Hz. Discrimination 96%.       I personally reviewed the audiogram from 10/2024 that showed:            Right ear: mild conductive hearing loss with a 20db of air bone gap. Discrimination: 100%            Left ear: normal hearing . Discrimination: 100%       I reviewed her audiogram from 08/2023 that showed a mild conductive hearing loss on the left side with closure of the air-borne gap within 10dB. On the right side, she has a moderate conductive hearing loss in the low frequencies upsloping to mild levels in the mid and high frequencies. She has a 25dB of air-borne gap in the low and mid frequencies on the right side. She has 100% discrimination bilaterally.       I reviewed her audiogram from 06/2022 that shows mild conductive hearing loss on the right side and a normal hearing on the left side with a 10 decibel air-bone gap. She has 100% discrimination on the right and 96% on the left. This is significant improvement.          I reviewed and interpreted the patient's audiogram 11/2021, which shows mild conductive hearing loss on the right side and moderate conductive hearing loss on the left side. The discrimination is 100% on the right and 96% on the left.       Procedure:  None    Assessment/Plan    1. Acquired stenosis of both  external ear canals    2. Conductive hearing loss, bilateral    3. Tympanic membrane perforation, left    4. Dysfunction of both eustachian tubes    5. Cleft palate and lip ([facility])    6. Bilateral impacted cerumen        In summary, Amanda Banks is a 30 y.o. 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     She is s/p left-sided tympanoplasty and canaloplasty with reconstruction of acquired stenosis of the ear canal with skin graft from the left arm. The skin of the ear canal  healed well for the first 1.5 years, but after an ear infection, there is a little bit of mucosalized drum anteriorly that currently dry and also a small tympanic membrane perforation.     As the right was her worse hearing ear, she elected to undergo surgery. We performed a tympanoplasty, canaloplasty, and repair of the acquired stenosis. The skin grafts on the right are taking. I cleaned some of the skin debris. The judd tympanum is in good condition. We achieved closure of the air-bone gap within 15 dB.    Follow-up in 6 weeks.       ____________________________________________________  Thierno Cano MD  Professor and Chief   Otology/Neurotology/Lateral Skull-Base Surgery   [facility]    Scribe Attestation  By signing my name below, I, Tracy Abreu, Scribe   attest that this documentation has been prepared under the direction and in the presence of Thierno Marrero MD.

## 2025-06-16 NOTE — PROGRESS NOTES
Name: Amanda Banks  YOB: 1995  Age: 30 y.o.    Date of Evaluation:  6/16/2025      History:      Patient presents today for hearing test in conjunction with ENT visit.  Patient reports improved hearing post-operatively.   She is s/p left-sided tympanoplasty and canaloplasty with reconstruction of acquired stenosis of the ear canal with skin graft. She is status post right-sided tympanoplasty, canaloplasty, and repair of the acquired stenosis. Had ears cleaned today in ENT prior to hearing test.     Evaluation:    Otoscopy revealed surgical ear canals bilaterally.    Immittance testing revealed negative middle ear pressure left ear and hypomobile right eardrum.  Mild conducive hearing loss bilaterally with excellent word discrimination (100%)  Improvement in thresholds noted bilaterally.       Treatment Plan:    - Follow up with Dr. Cano  - Amplification pending medical clearance;  if patient perception changes  - Retest hearing in conjunction with otologic management or in one year      [id number]    Fortunato Ryder, CCC-A

## 2025-07-11 ENCOUNTER — TELEPHONE (OUTPATIENT)
Dept: OTOLARYNGOLOGY | Facility: HOSPITAL | Age: 30
End: 2025-07-11
Payer: COMMERCIAL

## 2025-07-11 NOTE — TELEPHONE ENCOUNTER
RN instructed patient to use ciprodex ear drops in right ear 4 drops BID until follow up with Dr. Cano per Md's instruction. MD reviewed patient email to RN including pictures of ear drainage and will be seeing patient for a FUV on 7/21/25.

## 2025-07-21 ENCOUNTER — APPOINTMENT (OUTPATIENT)
Dept: OTOLARYNGOLOGY | Facility: CLINIC | Age: 30
End: 2025-07-21
Payer: COMMERCIAL

## 2025-07-21 DIAGNOSIS — H90.0 CONDUCTIVE HEARING LOSS, BILATERAL: ICD-10-CM

## 2025-07-21 DIAGNOSIS — H72.92 TYMPANIC MEMBRANE PERFORATION, LEFT: ICD-10-CM

## 2025-07-21 DIAGNOSIS — H61.21 IMPACTED CERUMEN OF RIGHT EAR: ICD-10-CM

## 2025-07-21 DIAGNOSIS — Q37.9: ICD-10-CM

## 2025-07-21 DIAGNOSIS — H61.303 ACQUIRED STENOSIS OF BOTH EXTERNAL EAR CANALS: Primary | ICD-10-CM

## 2025-07-21 PROCEDURE — 99213 OFFICE O/P EST LOW 20 MIN: CPT | Performed by: OTOLARYNGOLOGY

## 2025-07-21 PROCEDURE — 69210 REMOVE IMPACTED EAR WAX UNI: CPT | Performed by: OTOLARYNGOLOGY

## 2025-07-21 NOTE — PROGRESS NOTES
Reason for Consult:  Ear Drainage     Subjective   History Of Present Illness:  Amanda Banks is a 30 y.o. 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     She is s/p left-sided tympanoplasty and canaloplasty with reconstruction of acquired stenosis of the ear canal with skin graft from the left arm. The skin of the ear canal healed well for the first 1.5 years. After an ear infection, the EAC started to mucolized and now has a small tympanic membrane perforation that we are watching.     On the right side, she continued to have conductive hearing loss. For that reason, I took her to the OR for a reconstruction of her EAC, tympanoplasty, and canalplasty without OCR on 02/25/2025.     During the procedure, I encountered:  1. Acquired stenosis of the medial external auditory canal, with fibrosis of the soft tissue of the bony EAC starting at the BC junction extending to the lateral surface of the tympanic membrane. Additional irregular regions of bony ear canal overgrowth, notably anteriorly. This resulted in blunting and lateralization of the drum, which ended in a blind pouch.   2. Malleus, incus and stapes intact, footplate mobile. Chorda tympani nerve visualized and left undisturbed.   3. Retained PE tube in the middle ear space, removed.  4. Large canalplasty performed. Anterior canalplasty limited by TMJ, which was relatively posterior.  5. Inferior 2/3 of fibrous tympanic membrane preserved. Lateral to malleus underlay tympanoplasty technique used to cover inferior central perforation and pars flaccida region with temporalis fascia. Skin grafts placed to cover the walls of the external auditory canal.  6. 0.12 inch thickness skin graft from the right upper extremity was trimmed and placed over the walls of the external auditory canal.  7. Single incision meatoplasty performed. Ambrus ear wick placed to keep EAC patent.     Since surgery,  she has been doing well. She does feel her right ear is muffled. She went to [place] and had some drainage at that time     Past Medical History:  She has a past medical history of Adverse effect of anesthesia, Arrhythmia, blood clots, and Migraines.    Surgical History:  She has a past surgical history that includes Other surgical history; Tympanostomy tube placement; Lumbar fusion; Cosmetic surgery; Appendectomy; and Other surgical history.     Social History:  She reports that she has never smoked. She has never been exposed to tobacco smoke. She has never used smokeless tobacco. She reports that she does not currently use alcohol. She reports that she does not use drugs.    Family History:  family history is not on file.     Medications:  Current Outpatient Medications   Medication Instructions    amitriptyline (ELAVIL) 10 mg    ascorbic acid (VITAMIN C) 1,000 mg, Daily RT    cephalexin (Keflex) 500 mg capsule     fluticasone (Flonase) 50 mcg/actuation nasal spray 2 sprays, Each Nostril, Daily, Shake gently. Before first use, prime pump. After use, clean tip and replace cap.    GARLIC ORAL Take by mouth.    metoprolol tartrate (Lopressor) 100 mg tablet 1 tablet, Every 12 hours scheduled (0630,1830)    multivitamin with iron (Child Multivitamin Plus Iron) chewable tablet Chew.    naratriptan (AMERGE) 2.5 mg, Once as needed    omega 3-dha-epa-fish oil (Fish OiL) 1,000 (120-180) mg capsule Every 12 hours    ondansetron (Zofran) 4 mg tablet     pantoprazole (PROTONIX) 40 mg    tobramycin-dexamethasone (Tobradex) ophthalmic suspension     zinc gluconate 30 mg tablet Take by mouth.      Allergies:  Latex, natural rubber; Sulfa (sulfonamide antibiotics); and Percocet [oxycodone-acetaminophen]    Review of Systems:   A comprehensive 10-point review of systems was obtained including constitutional, neurological, HEENT, pulmonary, cardiovascular, genito-urinary, and other pertinent systems and was negative except as  noted in the HPI.     Objective   Physical Exam:  Last Recorded Vitals: There were no vitals taken for this visit.    On physical exam, the patient is a well-nourished, well-developed patient, in no acute distress, able to communicate without assistance in English language. Head and face is atraumatic and normocephalic. Salivary glands are intact. Facial strength is symmetrical bilaterally.       On ear examination:  Right ear: The patient has cerumen impaction which was removed. There are areas of inflammation of the ear canal. I applied gentian violet and boric acid powder.  AC>BC  Left ear:  The neotympanum has some blunting with mucoid drainage that was suctioned out.   AC>BC  The Gastelum is midline    On vestibular exam, the patient has no spontaneous nystagmus, no headshake nystagmus, no head-thrust nystagmus, and no nystagmus on hyperventilation or Valsalva maneuvers. [place]-Hallpike maneuver is negative bilaterally.       On neuro exam, the patient is alert and oriented x3, cranial nerves are grossly intact, cerebellar exam is normal.      The rest of the exam, including anterior rhinoscopy, oropharyngeal exam, neck exam, and cardiovascular exam, were normal including no palpable lymphadenopathies, thyroid in the midline position, normal pulses, and normal chest excursion.       Reviewed Results:  Audiology Testing:     I personally reviewed the audiogram from 6/2025 that showed:  Right ear: moderate conductive hearing loss with 15dB ABG. Discrimination 100%.   Left ear: mild conductive hearing loss in the low and high frequencies with normal hearing in the mid frequencies, 10 dB of air-bone gap. Discrimination 100%.       I personally reviewed the audiogram from 1/9/2025 that showed:  Right ear: moderate conductive hearing loss with  25-35dB ABG. Discrimination 92%.   Left ear: mild conductive hearing loss in the low and high frequencies with normal hearing in the mid frequencies, max ABG 15-20dB ABG at 500 and  4000 Hz. Discrimination 96%.       I personally reviewed the audiogram from 10/2024 that showed:            Right ear: mild conductive hearing loss with a 20db of air bone gap. Discrimination: 100%            Left ear: normal hearing . Discrimination: 100%       I reviewed her audiogram from 08/2023 that showed a mild conductive hearing loss on the left side with closure of the air-borne gap within 10dB. On the right side, she has a moderate conductive hearing loss in the low frequencies upsloping to mild levels in the mid and high frequencies. She has a 25dB of air-borne gap in the low and mid frequencies on the right side. She has 100% discrimination bilaterally.       I reviewed her audiogram from 06/2022 that shows mild conductive hearing loss on the right side and a normal hearing on the left side with a 10 decibel air-bone gap. She has 100% discrimination on the right and 96% on the left. This is significant improvement.          I reviewed and interpreted the patient's audiogram 11/2021, which shows mild conductive hearing loss on the right side and moderate conductive hearing loss on the left side. The discrimination is 100% on the right and 96% on the left.         Procedure:  None    Assessment/Plan     1. 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 Tympanic membrane perforation, left    3. Conductive hearing loss, bilateral    4. Cleft palate and lip ([facility])        In summary, Amanda Banks is a 30 y.o. 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     She is s/p left-sided tympanoplasty and canaloplasty with reconstruction of acquired stenosis of the ear canal with skin graft from the left arm. The skin of the ear canal  healed well for the first 1.5 years, but after an ear infection, there is a little bit of mucosalized drum anteriorly that currently dry and also a small tympanic membrane perforation.     As the right was her worse hearing ear, she elected to undergo  surgery. We performed a tympanoplasty, canaloplasty, and repair of the acquired stenosis. We achieved closure of the air-bone gap within 15db. There are areas of inflammation. I applied boric acid and gentian violet. She will use powder for 3-4 weeks.     Follow up in 4 weeks.           ____________________________________________________  Thierno Cano MD  Professor and Chief   Otology/Neurotology/Lateral Skull-Base Surgery   [facility]    Scribe Attestation:  By signing my name below, I, Cristhian Macedo attest that this documentation has been prepared under the direction and in the presence of Thierno Marrero MD.    Provider Attestation - Scribe documentation:  All medical record entries made by Tracy Abreu were at my direction and personally dictated by me, Thierno Marrero MD. I have reviewed the chart and agree that the record is accurate and I confirmed that it reflects my personal performance of the history, physical exam, discussion, and plan.

## 2025-07-21 NOTE — Clinical Note
July 28, 2025     No Recipients    Patient: Amanda Banks   YOB: 1995   Date of Visit: 7/21/2025       Dear Dr. Siegel Recipients:    Thank you for referring Amanda Banks to me for evaluation. Below are my notes for this consultation.  If you have questions, please do not hesitate to call me. I look forward to following your patient along with you.       Sincerely,     Thierno Marrero MD      CC: No Recipients  ______________________________________________________________________________________            Reason for Consult:  Ear Drainage     Subjective  History Of Present Illness:  Amanda Banks is a 30 y.o. 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     She is s/p left-sided tympanoplasty and canaloplasty with reconstruction of acquired stenosis of the ear canal with skin graft from the left arm. The skin of the ear canal healed well for the first 1.5 years. After an ear infection, the EAC started to mucolized and now has a small tympanic membrane perforation that we are watching.     On the right side, she continued to have conductive hearing loss. For that reason, I took her to the OR for a reconstruction of her EAC, tympanoplasty, and canalplasty without OCR on 02/25/2025.     During the procedure, I encountered:  1. Acquired stenosis of the medial external auditory canal, with fibrosis of the soft tissue of the bony EAC starting at the BC junction extending to the lateral surface of the tympanic membrane. Additional irregular regions of bony ear canal overgrowth, notably anteriorly. This resulted in blunting and lateralization of the drum, which ended in a blind pouch.   2. Malleus, incus and stapes intact, footplate mobile. Chorda tympani nerve visualized and left undisturbed.   3. Retained PE tube in the middle ear space, removed.  4. Large canalplasty performed. Anterior canalplasty limited by TMJ, which was relatively  posterior.  5. Inferior 2/3 of fibrous tympanic membrane preserved. Lateral to malleus underlay tympanoplasty technique used to cover inferior central perforation and pars flaccida region with temporalis fascia. Skin grafts placed to cover the walls of the external auditory canal.  6. 0.12 inch thickness skin graft from the right upper extremity was trimmed and placed over the walls of the external auditory canal.  7. Single incision meatoplasty performed. Ambrus ear wick placed to keep EAC patent.     Since surgery, she has been doing well. She does feel her right ear is muffled. She went to [place] and had some drainage at that time     Past Medical History:  She has a past medical history of Adverse effect of anesthesia, Arrhythmia, blood clots, and Migraines.    Surgical History:  She has a past surgical history that includes Other surgical history; Tympanostomy tube placement; Lumbar fusion; Cosmetic surgery; Appendectomy; and Other surgical history.     Social History:  She reports that she has never smoked. She has never been exposed to tobacco smoke. She has never used smokeless tobacco. She reports that she does not currently use alcohol. She reports that she does not use drugs.    Family History:  family history is not on file.     Medications:  Current Outpatient Medications   Medication Instructions    amitriptyline (ELAVIL) 10 mg    ascorbic acid (VITAMIN C) 1,000 mg, Daily RT    cephalexin (Keflex) 500 mg capsule     fluticasone (Flonase) 50 mcg/actuation nasal spray 2 sprays, Each Nostril, Daily, Shake gently. Before first use, prime pump. After use, clean tip and replace cap.    GARLIC ORAL Take by mouth.    metoprolol tartrate (Lopressor) 100 mg tablet 1 tablet, Every 12 hours scheduled (0630,1830)    multivitamin with iron (Child Multivitamin Plus Iron) chewable tablet Chew.    naratriptan (AMERGE) 2.5 mg, Once as needed    omega 3-dha-epa-fish oil (Fish OiL) 1,000 (120-180) mg capsule Every 12  hours    ondansetron (Zofran) 4 mg tablet     pantoprazole (PROTONIX) 40 mg    tobramycin-dexamethasone (Tobradex) ophthalmic suspension     zinc gluconate 30 mg tablet Take by mouth.      Allergies:  Latex, natural rubber; Sulfa (sulfonamide antibiotics); and Percocet [oxycodone-acetaminophen]    Review of Systems:   A comprehensive 10-point review of systems was obtained including constitutional, neurological, HEENT, pulmonary, cardiovascular, genito-urinary, and other pertinent systems and was negative except as noted in the HPI.     Objective  Physical Exam:  Last Recorded Vitals: There were no vitals taken for this visit.    On physical exam, the patient is a well-nourished, well-developed patient, in no acute distress, able to communicate without assistance in English language. Head and face is atraumatic and normocephalic. Salivary glands are intact. Facial strength is symmetrical bilaterally.       On ear examination:  Right ear: The patient has cerumen impaction which was removed. There are areas of inflammation of the ear canal. I applied gentian violet and boric acid powder.  AC>BC  Left ear:  The neotympanum has some blunting with mucoid drainage that was suctioned out.   AC>BC  The Gastelum is midline    On vestibular exam, the patient has no spontaneous nystagmus, no headshake nystagmus, no head-thrust nystagmus, and no nystagmus on hyperventilation or Valsalva maneuvers. Columbus-Hallpike maneuver is negative bilaterally.       On neuro exam, the patient is alert and oriented x3, cranial nerves are grossly intact, cerebellar exam is normal.      The rest of the exam, including anterior rhinoscopy, oropharyngeal exam, neck exam, and cardiovascular exam, were normal including no palpable lymphadenopathies, thyroid in the midline position, normal pulses, and normal chest excursion.       Reviewed Results:  Audiology Testing:     I personally reviewed the audiogram from 6/2025 that showed:  Right ear: moderate  conductive hearing loss with 15dB ABG. Discrimination 100%.   Left ear: mild conductive hearing loss in the low and high frequencies with normal hearing in the mid frequencies, 10 dB of air-bone gap. Discrimination 100%.       I personally reviewed the audiogram from 1/9/2025 that showed:  Right ear: moderate conductive hearing loss with  25-35dB ABG. Discrimination 92%.   Left ear: mild conductive hearing loss in the low and high frequencies with normal hearing in the mid frequencies, max ABG 15-20dB ABG at 500 and 4000 Hz. Discrimination 96%.       I personally reviewed the audiogram from 10/2024 that showed:            Right ear: mild conductive hearing loss with a 20db of air bone gap. Discrimination: 100%            Left ear: normal hearing . Discrimination: 100%       I reviewed her audiogram from 08/2023 that showed a mild conductive hearing loss on the left side with closure of the air-borne gap within 10dB. On the right side, she has a moderate conductive hearing loss in the low frequencies upsloping to mild levels in the mid and high frequencies. She has a 25dB of air-borne gap in the low and mid frequencies on the right side. She has 100% discrimination bilaterally.       I reviewed her audiogram from 06/2022 that shows mild conductive hearing loss on the right side and a normal hearing on the left side with a 10 decibel air-bone gap. She has 100% discrimination on the right and 96% on the left. This is significant improvement.          I reviewed and interpreted the patient's audiogram 11/2021, which shows mild conductive hearing loss on the right side and moderate conductive hearing loss on the left side. The discrimination is 100% on the right and 96% on the left.         Procedure:  None    Assessment/Plan    1. 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 Tympanic membrane perforation, left    3. Conductive hearing loss, bilateral    4. Cleft palate and lip ([facility])        In summary, Amanda  Jocelyn is a 30 y.o. 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     She is s/p left-sided tympanoplasty and canaloplasty with reconstruction of acquired stenosis of the ear canal with skin graft from the left arm. The skin of the ear canal  healed well for the first 1.5 years, but after an ear infection, there is a little bit of mucosalized drum anteriorly that currently dry and also a small tympanic membrane perforation.     As the right was her worse hearing ear, she elected to undergo surgery. We performed a tympanoplasty, canaloplasty, and repair of the acquired stenosis. We achieved closure of the air-bone gap within 15db. There are areas of inflammation. I applied boric acid and gentian violet. She will use powder for 3-4 weeks.     Follow up in 4 weeks.           ____________________________________________________  Thierno Cano MD  Professor and Chief   Otology/Neurotology/Lateral Skull-Base Surgery   [facility]    Scribe Attestation:  By signing my name below, I, Cristhian Macedo attest that this documentation has been prepared under the direction and in the presence of Thierno Marrero MD.    Provider Attestation - Scribe documentation:  All medical record entries made by Tracy Abreu were at my direction and personally dictated by me, Thierno Marrero MD. I have reviewed the chart and agree that the record is accurate and I confirmed that it reflects my personal performance of the history, physical exam, discussion, and plan.

## 2025-08-11 ENCOUNTER — APPOINTMENT (OUTPATIENT)
Dept: OTOLARYNGOLOGY | Facility: CLINIC | Age: 30
End: 2025-08-11
Payer: COMMERCIAL

## 2025-08-11 VITALS — HEIGHT: 66 IN | WEIGHT: 212.2 LBS | BODY MASS INDEX: 34.1 KG/M2

## 2025-08-11 DIAGNOSIS — H90.0 CONDUCTIVE HEARING LOSS, BILATERAL: ICD-10-CM

## 2025-08-11 DIAGNOSIS — H61.21 IMPACTED CERUMEN OF RIGHT EAR: ICD-10-CM

## 2025-08-11 DIAGNOSIS — Q36.9 CLEFT LIP: ICD-10-CM

## 2025-08-11 DIAGNOSIS — H61.303 ACQUIRED STENOSIS OF BOTH EXTERNAL EAR CANALS: Primary | ICD-10-CM

## 2025-08-11 PROCEDURE — 3008F BODY MASS INDEX DOCD: CPT | Performed by: OTOLARYNGOLOGY

## 2025-08-11 PROCEDURE — 99213 OFFICE O/P EST LOW 20 MIN: CPT | Performed by: OTOLARYNGOLOGY

## 2025-08-11 PROCEDURE — 69210 REMOVE IMPACTED EAR WAX UNI: CPT | Performed by: OTOLARYNGOLOGY

## 2025-10-13 ENCOUNTER — APPOINTMENT (OUTPATIENT)
Dept: OTOLARYNGOLOGY | Facility: CLINIC | Age: 30
End: 2025-10-13
Payer: COMMERCIAL

## 2025-10-27 ENCOUNTER — APPOINTMENT (OUTPATIENT)
Dept: OTOLARYNGOLOGY | Facility: CLINIC | Age: 30
End: 2025-10-27
Payer: COMMERCIAL

## (undated) DEVICE — SUTURE, VICRYL, 3-0,18 IN, SH, UNDYED

## (undated) DEVICE — BUR, ELITE, TAPERED DIAMOND, 1.5 MM

## (undated) DEVICE — DRESSING, OTOPORE,STANDARD CYLINDER

## (undated) DEVICE — BUR, ELITE, ROUND DIAMOND, 2.0 MM, COARSE

## (undated) DEVICE — COMB, HAIR, 7 IN, PLASTIC, BLACK

## (undated) DEVICE — Device

## (undated) DEVICE — DRESSING, GAUZE, PETROLATUM, XEROFORM, 5 X 9 IN, STERILE

## (undated) DEVICE — SYRINGE, MONOJECT, LUER LOCK, 3 CC, LF

## (undated) DEVICE — BLADE, OPHTHALMIC, BEAVER, MINI, SHARP ONE SIDE

## (undated) DEVICE — COTTON BALL, LARGE, STERILE

## (undated) DEVICE — DRAPE, SURGICAL, OTOLOGY GLASSCOCK

## (undated) DEVICE — SYRINGE, 1 CC, LUER LOCK

## (undated) DEVICE — ELECTRODE, PAIRED SUBDERMAL OTO

## (undated) DEVICE — CUP, SOLUTION

## (undated) DEVICE — BLADE, TYPANOPLASTY, 60 DEG ANGLED, 2.5MM

## (undated) DEVICE — PACKING, PLAIN, CURITY, 0.25 IN X 5 YD, STERILE

## (undated) DEVICE — BLADE, DERMATOME, 1.25 X 4.25 IN, STERILE

## (undated) DEVICE — FORCEPS, BIPOLAR, INSULATED BEYONET

## (undated) DEVICE — NEEDLE, HYPODERMIC, MONOJECT, 27 G X 1.5 IN

## (undated) DEVICE — TUBING, SUCTION, OTOMED

## (undated) DEVICE — CATHETER, IV, ANGIOCATH, 14 G X 1.16 IN, FEP POLYMER

## (undated) DEVICE — ELECTRODE, ELECTROSURGICAL, BLADE, INSULATED, ENT/IMA, STERILE

## (undated) DEVICE — STOCKINETTE, IMPERVIOUS, 12 X 48 IN, STERILE

## (undated) DEVICE — DRAPE, MICROSCOPE, W/REMOVABLE LENS

## (undated) DEVICE — BUR, ELITE, ROUND DIAMOND, 3.0 MM, COARSE

## (undated) DEVICE — TOWEL, SURGICAL, NEURO, O/R, 16 X 26, BLUE, STERILE

## (undated) DEVICE — GOWN, SURGICAL, REINFORCED, XLARGE, X-LONG, STERILE

## (undated) DEVICE — CLEANER, WIPE, INSTRUMENT, 3.25 X 3.25 IN

## (undated) DEVICE — GLOVE, SURGICAL, PROTEXIS PI , 7.5, PF, LF